# Patient Record
Sex: FEMALE | Race: WHITE | Employment: FULL TIME | URBAN - METROPOLITAN AREA
[De-identification: names, ages, dates, MRNs, and addresses within clinical notes are randomized per-mention and may not be internally consistent; named-entity substitution may affect disease eponyms.]

---

## 2017-01-27 ENCOUNTER — TELEPHONE (OUTPATIENT)
Dept: OTHER | Facility: CLINIC | Age: 57
End: 2017-01-27

## 2017-05-31 DIAGNOSIS — G35 MULTIPLE SCLEROSIS (H): ICD-10-CM

## 2017-06-01 ENCOUNTER — TELEPHONE (OUTPATIENT)
Dept: NEUROLOGY | Facility: CLINIC | Age: 57
End: 2017-06-01

## 2017-06-01 RX ORDER — INTERFERON BETA-1A 44 UG/.5ML
INJECTION, SOLUTION SUBCUTANEOUS
Qty: 12 SYRINGE | Refills: 11 | Status: SHIPPED | OUTPATIENT
Start: 2017-06-01 | End: 2018-12-28

## 2017-06-01 NOTE — TELEPHONE ENCOUNTER
Received refill request for Rebif from [facility]; Patient was last seen in November and has no follow up appointment with any MS doctor, Refilled for 1 year per MS refill protocol. I will have the clinic coordinators call her and set up an appointment with Dr. Rose.    Sowmya Valerio, MS RN Care Coordinator

## 2017-06-01 NOTE — TELEPHONE ENCOUNTER
PA Initiation    Medication: Rebif 44g  Insurance Company: [employer] - Phone [phone] Fax [phone]  Pharmacy Filling the Rx: [facility] - [place], [place] - [facility] [address]  Filling Pharmacy Phone:    Filling Pharmacy Fax:    Start Date: 6/1/2017    Paper PA completed, faxed and sent to scanning.

## 2017-06-01 NOTE — TELEPHONE ENCOUNTER
Our clinic coordinators called this patient to schedule a follow up. Andreina states she is transferring her care to [facility].    Sowmya Valerio MS RN Care Coordinator

## 2017-06-01 NOTE — TELEPHONE ENCOUNTER
Prior Authorization Specialty Medication Request    Medication/Dose: Rebif 44mcg  Diagnosis and ICD: Relapsing Remitting Multiple Sclerosis, G35  New/Renewal/Insurance Change PA: Renewal    Important Lab Values: n/a    Previously Tried and Failed Therapies: Avenox    Rationale: Continuation of disease modifying therapy for demyelinating disease, clinically stable. Please approve.    Would you like to include any research articles? n/a   If yes please include the hyperlink(s) below or fax @ [phone].    (Include Name and MRN)    If you received a fax notification from an outside Pharmacy;  Pharmacy Name:[facility]  Pharmacy #:[phone]  Pharmacy Fax:[phone]

## 2017-06-05 NOTE — TELEPHONE ENCOUNTER
Prior Authorization Approval    Authorization Effective Date: 6/1/2017  Authorization Expiration Date: 6/1/2019  Medication: Rebif 44mcg  Approved Dose/Quantity: 12 per 28 days  Reference #: not legible on print out   Insurance Company: [employer] - Phone [phone] Fax [phone]  Expected CoPay: $25     CoPay Card Available:      Foundation Assistance Needed:    Which Pharmacy is filling the prescription (Not needed for infusion/clinic administered): [facility] - [place] [address] [address]  Pharmacy Notified: Yes  Patient Notified: YesComment:  Pharmacy notified patient.

## 2017-06-10 ENCOUNTER — HEALTH MAINTENANCE LETTER (OUTPATIENT)
Age: 57
End: 2017-06-10

## 2017-08-31 DIAGNOSIS — E78.5 HYPERLIPIDEMIA WITH TARGET LDL LESS THAN 160: ICD-10-CM

## 2017-08-31 RX ORDER — ATORVASTATIN CALCIUM 10 MG/1
10 TABLET, FILM COATED ORAL DAILY
Qty: 90 TABLET | Refills: 1 | OUTPATIENT
Start: 2017-08-31

## 2017-08-31 NOTE — TELEPHONE ENCOUNTER
I have not seen her since October 2015 so I can not fill this  She may be seeing a different PCP elsewhere

## 2017-08-31 NOTE — TELEPHONE ENCOUNTER
Routing refill request to provider for review/approval because:  A break in medication  Patient needs to be seen because it has been more than 1 year since last office visit.  Needs updated lipids    Babita Morelos RN   [facility], [facility]

## 2017-08-31 NOTE — TELEPHONE ENCOUNTER
atorvastatin (LIPITOR) 10 MG tablet       Last Written Prescription Date: 10/18/16  Last Fill Quantity: 90, # refills: 1    Last Office Visit with G, P or [facility] prescribing provider:  10/23/15   Future Office Visit:      Cholesterol   Date Value Ref Range Status   07/11/2016 237 (H) <200 mg/dL Final     Comment:     Desirable:       <200 mg/dl     HDL Cholesterol   Date Value Ref Range Status   07/11/2016 57 >49 mg/dL Final     LDL Cholesterol Calculated   Date Value Ref Range Status   07/11/2016 156 (H) <100 mg/dL Final     Comment:     Above desirable:  100-129 mg/dl   Borderline High:  130-159 mg/dL   High:             160-189 mg/dL   Very high:       >189 mg/dl       Triglycerides   Date Value Ref Range Status   07/11/2016 121 <150 mg/dL Final     Comment:     Fasting specimen     Cholesterol/HDL Ratio   Date Value Ref Range Status   10/23/2015 5.3 (H) 0.0 - 5.0 Final     ALT   Date Value Ref Range Status   11/14/2016 28 0 - 50 U/L Final        LAMAR Mathew

## 2017-08-31 NOTE — TELEPHONE ENCOUNTER
Spoke with the patient, she has not transferred care and is not ready to schedule an appointment at this time. She will call back to schedule when ready. Notification sent to pharmacy that patient needs an office visit.    Babita Morelos RN   [facility]

## 2017-09-21 ENCOUNTER — OFFICE VISIT (OUTPATIENT)
Dept: PEDIATRICS | Facility: CLINIC | Age: 57
End: 2017-09-21
Payer: COMMERCIAL

## 2017-09-21 VITALS
DIASTOLIC BLOOD PRESSURE: 75 MMHG | OXYGEN SATURATION: 100 % | WEIGHT: 126 LBS | HEIGHT: 64 IN | SYSTOLIC BLOOD PRESSURE: 117 MMHG | TEMPERATURE: 99.7 F | HEART RATE: 75 BPM | BODY MASS INDEX: 21.51 KG/M2

## 2017-09-21 DIAGNOSIS — Z11.59 NEED FOR HEPATITIS C SCREENING TEST: ICD-10-CM

## 2017-09-21 DIAGNOSIS — Z23 NEED FOR PROPHYLACTIC VACCINATION AND INOCULATION AGAINST INFLUENZA: ICD-10-CM

## 2017-09-21 DIAGNOSIS — Z12.31 VISIT FOR SCREENING MAMMOGRAM: ICD-10-CM

## 2017-09-21 DIAGNOSIS — E78.5 HYPERLIPIDEMIA WITH TARGET LDL LESS THAN 160: Primary | ICD-10-CM

## 2017-09-21 DIAGNOSIS — E78.5 HYPERLIPIDEMIA WITH TARGET LDL LESS THAN 160: ICD-10-CM

## 2017-09-21 DIAGNOSIS — Z13.1 SCREENING FOR DIABETES MELLITUS: ICD-10-CM

## 2017-09-21 LAB
ALBUMIN SERPL-MCNC: 3.8 G/DL (ref 3.4–5)
ALP SERPL-CCNC: 79 U/L (ref 40–150)
ALT SERPL W P-5'-P-CCNC: 31 U/L (ref 0–50)
ANION GAP SERPL CALCULATED.3IONS-SCNC: 5 MMOL/L (ref 3–14)
AST SERPL W P-5'-P-CCNC: 22 U/L (ref 0–45)
BILIRUB SERPL-MCNC: 0.3 MG/DL (ref 0.2–1.3)
BUN SERPL-MCNC: 19 MG/DL (ref 7–30)
CALCIUM SERPL-MCNC: 10.3 MG/DL (ref 8.5–10.1)
CHLORIDE SERPL-SCNC: 106 MMOL/L (ref 94–109)
CHOLEST SERPL-MCNC: 206 MG/DL
CO2 SERPL-SCNC: 29 MMOL/L (ref 20–32)
CREAT SERPL-MCNC: 0.87 MG/DL (ref 0.52–1.04)
GFR SERPL CREATININE-BSD FRML MDRD: 68 ML/MIN/1.7M2
GLUCOSE SERPL-MCNC: 102 MG/DL (ref 70–99)
HDLC SERPL-MCNC: 44 MG/DL
LDLC SERPL CALC-MCNC: 137 MG/DL
NONHDLC SERPL-MCNC: 162 MG/DL
POTASSIUM SERPL-SCNC: 4.2 MMOL/L (ref 3.4–5.3)
PROT SERPL-MCNC: 7.5 G/DL (ref 6.8–8.8)
SODIUM SERPL-SCNC: 140 MMOL/L (ref 133–144)
TRIGL SERPL-MCNC: 126 MG/DL

## 2017-09-21 PROCEDURE — 90686 IIV4 VACC NO PRSV 0.5 ML IM: CPT | Performed by: NURSE PRACTITIONER

## 2017-09-21 PROCEDURE — 86803 HEPATITIS C AB TEST: CPT | Performed by: NURSE PRACTITIONER

## 2017-09-21 PROCEDURE — 80053 COMPREHEN METABOLIC PANEL: CPT | Performed by: NURSE PRACTITIONER

## 2017-09-21 PROCEDURE — 99213 OFFICE O/P EST LOW 20 MIN: CPT | Mod: 25 | Performed by: NURSE PRACTITIONER

## 2017-09-21 PROCEDURE — 90471 IMMUNIZATION ADMIN: CPT | Performed by: NURSE PRACTITIONER

## 2017-09-21 PROCEDURE — 36415 COLL VENOUS BLD VENIPUNCTURE: CPT | Performed by: NURSE PRACTITIONER

## 2017-09-21 PROCEDURE — 80061 LIPID PANEL: CPT | Performed by: NURSE PRACTITIONER

## 2017-09-21 RX ORDER — ATORVASTATIN CALCIUM 10 MG/1
10 TABLET, FILM COATED ORAL DAILY
Qty: 90 TABLET | Refills: 3 | Status: SHIPPED | OUTPATIENT
Start: 2017-09-21 | End: 2018-12-06

## 2017-09-21 NOTE — PROGRESS NOTES

## 2017-09-21 NOTE — NURSING NOTE
"Chief Complaint   Patient presents with     Recheck Medication       Initial /75  Pulse 75  Temp 99  F (37.2  C) (Oral)  Ht 5' 4\" (1.626 m)  Wt 126 lb (57.2 kg)  SpO2 100%  BMI 21.63 kg/m2 Estimated body mass index is 21.63 kg/(m^2) as calculated from the following:    Height as of this encounter: 5' 4\" (1.626 m).    Weight as of this encounter: 126 lb (57.2 kg).  Medication Reconciliation: complete       Roxy Hernandez MA      "

## 2017-09-21 NOTE — PATIENT INSTRUCTIONS
PLAN:   1.   Symptomatic therapy suggested: Continue current medications.  2.  Orders Placed This Encounter   Medications     atorvastatin (LIPITOR) 10 MG tablet     Sig: Take 1 tablet (10 mg) by mouth daily     Dispense:  90 tablet     Refill:  3     Orders Placed This Encounter   Procedures     MA SCREENING DIGITAL BILAT - Future  (s+30)     HC FLU VAC PRESRV FREE QUAD SPLIT VIR 3+YRS IM     ADMIN 1st VACCINE       3. Patient needs to follow up in if no improvement,or sooner if worsening of symptoms or other symptoms develop.  FURTHER TESTING:       - mammogram  Will follow up and/or notify patient of  results via My Chart to determine further need for followup  Schedule physical exam   It was a pleasure seeing you today at the [facility]. Thank you for allowing us to care for you today. We truly hope we provided you with the excellent service you deserve. Please let us know if there is anything else we can do for you so we can be sure you are leaving completley satisfied with your care experience.       General information about your clinic   Clinic Hours Lab Hours (Appointments are required)   Mon-Thurs: 7:30 AM - 7 PM Mon-Thurs: 7:30 AM - 7 PM   Fri: 7:30 AM - 5 PM Fri: 7:30 AM - 5 PM        After Hours Nurse Advise & Appts:  [facility] Nurse Advisors: [phone]  [facility] On Call: to make appointments anytime: [phone] On Call Physician: call [phone] and answering service will page the on call physician.        For urgent appointments, please call [phone] and ask for the triage nurse or your care team clinic nurse.  How to contact my care team:  MyChart: [email or web address]   Phone: [phone]   Fax: [phone]       [facility]:   Phone: [phone]  Hours: 8:00 AM - 6:00 PM  Medication Refills:  Call your pharmacy and they will forward the refill to us. Please allow 3 business days for your refills to be completed.       Normal or non-critical lab  and imaging results will be communicated to you by MyChart, letter or phone within 7 days.  If you do not hear from us within 10 days, please call the clinic. If you have a critical or abnormal lab result, we will notify you by phone as soon as possible.       We now have PWIC (Pediatric Walk in Care)  Monday-Friday from 7:30-4. Simply walk in and be seen for your urgent needs like cough, fever, rash, diarrhea or vomiting, pink eye, UTI. No appointments needed. Ask one of the team for more information      -Your Care Team:    Dr. Nellie Quiles - Internal Medicine/Pediatrics   Dr. Ananda Duncan - Family Medicine  Dr. Christina Love - Pediatrics  Dr. Yojana Cavazos - Pediatrics  Marilyn Rose CNP - Family Practice Nurse Practitioner

## 2017-09-21 NOTE — MR AVS SNAPSHOT
After Visit Summary   9/21/2017    Andreina Duggan    MRN: [id number]           Patient Information     Date Of Birth          1960        Visit Information        Provider Department      9/21/2017 7:50 AM Marilyn Rose APRN [facility]        Today's Diagnoses     Hyperlipidemia with target LDL less than 160    -  1    Visit for screening mammogram        Screening for malignant neoplasm of cervix        Need for hepatitis C screening test        Need for prophylactic vaccination and inoculation against influenza          Care Instructions    PLAN:   1.   Symptomatic therapy suggested: Continue current medications.  2.  Orders Placed This Encounter   Medications     atorvastatin (LIPITOR) 10 MG tablet     Sig: Take 1 tablet (10 mg) by mouth daily     Dispense:  90 tablet     Refill:  3     Orders Placed This Encounter   Procedures     MA SCREENING DIGITAL BILAT - Future  (s+30)     HC FLU VAC PRESRV FREE QUAD SPLIT VIR 3+YRS IM     ADMIN 1st VACCINE       3. Patient needs to follow up in if no improvement,or sooner if worsening of symptoms or other symptoms develop.  FURTHER TESTING:       - mammogram  Will follow up and/or notify patient of  results via My Chart to determine further need for followup  Schedule physical exam   It was a pleasure seeing you today at the [facility]. Thank you for allowing us to care for you today. We truly hope we provided you with the excellent service you deserve. Please let us know if there is anything else we can do for you so we can be sure you are leaving completley satisfied with your care experience.       General information about your clinic   Clinic Hours Lab Hours (Appointments are required)   Mon-Thurs: 7:30 AM - 7 PM Mon-Thurs: 7:30 AM - 7 PM   Fri: 7:30 AM - 5 PM Fri: 7:30 AM - 5 PM        After Hours Nurse Advise & Appts:  [facility] Nurse Advisors: [phone]  [facility] On Call: to make  appointments anytime: [phone] On Call Physician: call [phone] and answering service will page the on call physician.        For urgent appointments, please call [phone] and ask for the triage nurse or your care team clinic nurse.  How to contact my care team:  Echo: [email or web address]   Phone: [phone]   Fax: [phone]       [facility]:   Phone: [phone]  Hours: 8:00 AM - 6:00 PM  Medication Refills:  Call your pharmacy and they will forward the refill to us. Please allow 3 business days for your refills to be completed.       Normal or non-critical lab and imaging results will be communicated to you by MyChart, letter or phone within 7 days.  If you do not hear from us within 10 days, please call the clinic. If you have a critical or abnormal lab result, we will notify you by phone as soon as possible.       We now have PWIC (Pediatric Walk in Care)  Monday-Friday from 7:30-4. Simply walk in and be seen for your urgent needs like cough, fever, rash, diarrhea or vomiting, pink eye, UTI. No appointments needed. Ask one of the team for more information      -Your Care Team:    Dr. Nellie Quiles - Internal Medicine/Pediatrics   Dr. Ananda Duncan - Family Medicine  Dr. Christina Love - Pediatrics  Dr. Yojana Cavazos - Pediatrics  Marilyn Rose CNP - Family Practice Nurse Practitioner                         Follow-ups after your visit        Future tests that were ordered for you today     Open Future Orders        Priority Expected Expires Ordered    MA SCREENING DIGITAL BILAT - Future  (s+30) Routine  9/21/2018 9/21/2017            Who to contact     If you have questions or need follow up information about today's clinic visit or your schedule please contact [facility] directly at [phone].  Normal or non-critical lab and imaging results will be communicated to you by MyChart, letter or phone within 4 business days after the clinic has received the results.  "If you do not hear from us within 7 days, please contact the clinic through [employer] or phone. If you have a critical or abnormal lab result, we will notify you by phone as soon as possible.  Submit refill requests through [employer] or call your pharmacy and they will forward the refill request to us. Please allow 3 business days for your refill to be completed.          Additional Information About Your Visit        [email or web address] Information     [employer] gives you secure access to your electronic health record. If you see a primary care provider, you can also send messages to your care team and make appointments. If you have questions, please call your primary care clinic.  If you do not have a primary care provider, please call [phone] and they will assist you.      YesWeAd is an electronic gateway that provides easy, online access to your medical records. With YesWe[employer], you can request a clinic appointment, read your test results, renew a prescription or communicate with your care team.     To access your existing account, please contact your [facility] or call [phone] for assistance.        Care EveryWhere ID     This is your Care EveryWhere ID. This could be used by other organizations to access your [facility] medical records  [id number]        Your Vitals Were     Pulse Temperature Height Pulse Oximetry BMI (Body Mass Index)       75 99.7  F (37.6  C) (Temporal) 5' 4\" (1.626 m) 100% 21.63 kg/m2        Blood Pressure from Last 3 Encounters:   09/21/17 117/75   11/14/16 144/85   03/31/16 109/65    Weight from Last 3 Encounters:   09/21/17 126 lb (57.2 kg)   03/23/16 127 lb (57.6 kg)   03/25/16 127 lb (57.6 kg)              We Performed the Following     ADMIN 1st VACCINE     HC FLU VAC PRESRV FREE QUAD SPLIT VIR 3+YRS IM          Where to get your medicines      These medications were sent to [id number] - [facility] [address] AT [facility] [address]  [address], [facility] [address]     Phone:  [phone]     atorvastatin 10 MG tablet          Primary Care Provider Office Phone # Fax #    Marilyn Rose, [facility]US [facility] [phone] [address]   [place] [address]        Equal Access to Services     DANIELA KAUR : Hadii aad ku hadasho Soomaali, waaxda luqadaha, qaybta kaalmada adeegyada, waxay idiin hayaan adeeg kharash la'aan . So [facility] [phone].    ATENCIÓN: Si habla español, tiene a glez disposición servicios gratuitos de asistencia lingüística. [facility] [phone].    We comply with applicable federal civil rights laws and [place] laws. We do not discriminate on the basis of race, color, national origin, age, disability sex, sexual orientation or gender identity.            Thank you!     Thank you for choosing [facility]  for your care. Our goal is always to provide you with excellent care. Hearing back from our patients is one way we can continue to improve our services. Please take a few minutes to complete the written survey that you may receive in the mail after your visit with us. Thank you!             Your Updated Medication List - Protect others around you: Learn how to safely use, store and throw away your medicines at www.disposemymeds.org.          This list is accurate as of: 9/21/17  8:22 AM.  Always use your most recent med list.                   Brand Name Dispense Instructions for use Diagnosis    ASPIRIN PO      Take 325 mg by mouth as needed for moderate pain        atorvastatin 10 MG tablet    LIPITOR    90 tablet    Take 1 tablet (10 mg) by mouth daily    Hyperlipidemia with target LDL less than 160       MULTIPLE VITAMIN PO      Take by mouth daily        REBIF 44 MCG/0.5ML injection   Generic drug:  interferon beta-1a     12 Syringe    INJECT THE CONTENTS OF 1 SYRINGE (44MCG) UNDER THE SKIN THREE TIMES WEEKLY    Multiple sclerosis (H)       vitamin D 2000 UNITS  tablet      Take 1 tablet by mouth daily

## 2017-09-21 NOTE — PROGRESS NOTES
Grace Duggan,    Attached are your test results.  -Liver and gallbladder tests (ALT,AST, Alk phos,bilirubin) are normal.  -Kidney function (GFR) is normal.  -Sodium is normal.  -Potassium is normal.  -Glucose is slight elevated and may be sign of early diabetes (prediabetes). ADVISE:: low carbohydrate diet, exercise, try to lose weight (if necessary) and recheck glucose in 12 months. (GLU,A1C, DX: prediabetes)  -Cholesterol levels are at your goal levels.  ADVISE: Continuing your medication, a regular exercise program with at least 30 minutes of aerobic exercise 3-4 days/week ( 45 minutes 4-6 days/week if weight loss needed), and a low saturated fat,/low carbohydrate diet are helpful to maintain this.  Rechecking your fasting cholesterol panel in 12 months is recommended (Lipid w/ LDL reflex).   Please contact us if you have any questions.    Marilyn Rose, CNP

## 2017-09-21 NOTE — PROGRESS NOTES
SUBJECTIVE:   Andreina Duggan is a 56 year old female who presents to clinic today for the following health issues:    Medication Followup of  LIPITOR    Taking Medication as prescribed: yes    Side Effects:  None    Medication Helping Symptoms:  Pt. States she is unsure until blood test is done to check cholesterol        Problem list and histories reviewed & adjusted, as indicated.  Additional history: as documented    Patient Active Problem List   Diagnosis     MS (multiple sclerosis) (H)     Hyperlipidemia with target LDL less than 160     Past Surgical History:   Procedure Laterality Date     COLONOSCOPY N/A 3/31/2016    Procedure: COLONOSCOPY;  Surgeon: Anuel Madrigal MD;  Location: [facility] OR     LAPAROSCOPY DIAGNOSTIC (GYN)  1980s    left ovary problems, a couple of scopes       Social History   Substance Use Topics     Smoking status: Current Every Day Smoker     Packs/day: 0.50     Years: 30.00     Types: Cigarettes     Smokeless tobacco: Never Used      Comment: she isn't sure when     Alcohol use 4.2 oz/week     7 Standard drinks or equivalent per week     Family History   Problem Relation Age of Onset     DIABETES Father      Hypertension Father      Breast Cancer No family hx of      Cancer - colorectal No family hx of          Current Outpatient Prescriptions   Medication Sig Dispense Refill     REBIF 44 MCG/0.5ML injection INJECT THE CONTENTS OF 1 SYRINGE (44MCG) UNDER THE SKIN THREE TIMES WEEKLY 12 Syringe 11     atorvastatin (LIPITOR) 10 MG tablet Take 1 tablet (10 mg) by mouth daily 90 tablet 1     Cholecalciferol (VITAMIN D) 2000 UNITS tablet Take 1 tablet by mouth daily       MULTIPLE VITAMIN PO Take by mouth daily       ASPIRIN PO Take 325 mg by mouth as needed for moderate pain       No Known Allergies  Recent Labs   Lab Test  09/21/17   0726  11/14/16   1523  07/11/16   0707  10/23/15   0833   01/07/14   1550   LDL  137*   --   156*  160*   < >   --    HDL  44*   --   57  49*   < >   --  "   TRIG  126   --   121  250*   < >   --    ALT  31  28  28  26   < >  44   CR  0.87   --    --   0.84   --    --    GFRESTIMATED  68   --    --   71   --    --    GFRESTBLACK  82   --    --   86   --    --    POTASSIUM  4.2   --    --   4.7   --    --    TSH   --    --    --   1.54   --   1.23    < > = values in this interval not displayed.      Labs reviewed in EPIC        Reviewed and updated as needed this visit by clinical staffAllergies  Meds       Reviewed and updated as needed this visit by Provider         ROS:  CONSTITUTIONAL:NEGATIVE for fever, chills, change in weight  RESP:NEGATIVE for significant cough or SOB  CV: NEGATIVE for chest pain, palpitations or peripheral edema  GI: NEGATIVE for nausea, abdominal pain, heartburn, or change in bowel habits  MUSCULOSKELETAL: NEGATIVE for significant arthralgias or myalgia  Sees Dr. Ribeiro at [facility] for follow up on MS   ENDOCRINE: NEGATIVE for temperature intolerance, skin/hair changes  HEME/ALLERGY/IMMUNE: NEGATIVE for bleeding problems    OBJECTIVE:     /75  Pulse 75  Temp 99.7  F (37.6  C) (Temporal)  Ht 5' 4\" (1.626 m)  Wt 126 lb (57.2 kg)  SpO2 100%  BMI 21.63 kg/m2  Body mass index is 21.63 kg/(m^2).  GENERAL APPEARANCE: alert, active and no distress  RESP: lungs clear to auscultation - no rales, rhonchi or wheezes  CV: regular rates and rhythm and no murmur, click or rub  MS: extremities normal- no gross deformities noted  NEURO: Normal strength and tone, mentation intact and speech normal  PSYCH: mentation appears normal and affect normal/bright    Diagnostic Test Results:  Results for orders placed or performed in visit on 09/21/17   Lipid panel reflex to direct LDL   Result Value Ref Range    Cholesterol 206 (H) <200 mg/dL    Triglycerides 126 <150 mg/dL    HDL Cholesterol 44 (L) >49 mg/dL    LDL Cholesterol Calculated 137 (H) <100 mg/dL    Non HDL Cholesterol 162 (H) <130 mg/dL   Hepatitis C Screen Reflex to HCV RNA Quant and " Genotype   Result Value Ref Range    Hepatitis C Antibody Nonreactive NR^Nonreactive   Comprehensive metabolic panel   Result Value Ref Range    Sodium 140 133 - 144 mmol/L    Potassium 4.2 3.4 - 5.3 mmol/L    Chloride 106 94 - 109 mmol/L    Carbon Dioxide 29 20 - 32 mmol/L    Anion Gap 5 3 - 14 mmol/L    Glucose 102 (H) 70 - 99 mg/dL    Urea Nitrogen 19 7 - 30 mg/dL    Creatinine 0.87 0.52 - 1.04 mg/dL    GFR Estimate 68 >60 mL/min/1.7m2    GFR Estimate If Black 82 >60 mL/min/1.7m2    Calcium 10.3 (H) 8.5 - 10.1 mg/dL    Bilirubin Total 0.3 0.2 - 1.3 mg/dL    Albumin 3.8 3.4 - 5.0 g/dL    Protein Total 7.5 6.8 - 8.8 g/dL    Alkaline Phosphatase 79 40 - 150 U/L    ALT 31 0 - 50 U/L    AST 22 0 - 45 U/L       ASSESSMENT/PLAN:       Andreina was seen today for recheck medication.    Diagnoses and all orders for this visit:    Hyperlipidemia with target LDL less than 160  -     atorvastatin (LIPITOR) 10 MG tablet; Take 1 tablet (10 mg) by mouth daily    Visit for screening mammogram  -     MA SCREENING DIGITAL BILAT - Future  (s+30); Future    Need for prophylactic vaccination and inoculation against influenza  -     HC FLU VAC PRESRV FREE QUAD SPLIT VIR 3+YRS IM  -     ADMIN 1st VACCINE    PLAN:   . Patient needs to follow up in if no improvement,or sooner if worsening of symptoms or other symptoms develop.  FURTHER TESTING:       - mammogram  Will follow up and/or notify patient of  results via My Chart to determine further need for followup  Schedule physical exam   See Patient Instructions    CASSIUS Ya CNP  [facility]

## 2017-09-22 LAB — HCV AB SERPL QL IA: NONREACTIVE

## 2017-09-22 NOTE — PROGRESS NOTES
Grace Duggan,    Attached are your test results.  -Hepatitis C antibody screen test shows no signs of a previous hepatitis C infection.   Please contact us if you have any questions.    Marilyn Rose, CNP

## 2017-09-26 ENCOUNTER — RADIANT APPOINTMENT (OUTPATIENT)
Dept: MAMMOGRAPHY | Facility: CLINIC | Age: 57
End: 2017-09-26
Attending: NURSE PRACTITIONER
Payer: COMMERCIAL

## 2017-09-26 DIAGNOSIS — G35 MULTIPLE SCLEROSIS (H): Primary | ICD-10-CM

## 2017-09-26 DIAGNOSIS — Z12.31 VISIT FOR SCREENING MAMMOGRAM: ICD-10-CM

## 2017-09-26 DIAGNOSIS — R27.0 ATAXIA: ICD-10-CM

## 2017-09-26 LAB
ALT SERPL W P-5'-P-CCNC: 30 U/L (ref 0–50)
AST SERPL W P-5'-P-CCNC: 19 U/L (ref 0–45)
BASOPHILS # BLD AUTO: 0 10E9/L (ref 0–0.2)
BASOPHILS NFR BLD AUTO: 0.2 %
BILIRUB SERPL-MCNC: 0.4 MG/DL (ref 0.2–1.3)
DIFFERENTIAL METHOD BLD: NORMAL
EOSINOPHIL # BLD AUTO: 0.2 10E9/L (ref 0–0.7)
EOSINOPHIL NFR BLD AUTO: 2.7 %
ERYTHROCYTE [DISTWIDTH] IN BLOOD BY AUTOMATED COUNT: 13.8 % (ref 10–15)
HCT VFR BLD AUTO: 39.4 % (ref 35–47)
HGB BLD-MCNC: 13.3 G/DL (ref 11.7–15.7)
LYMPHOCYTES # BLD AUTO: 1.2 10E9/L (ref 0.8–5.3)
LYMPHOCYTES NFR BLD AUTO: 20.7 %
MCH RBC QN AUTO: 30.4 PG (ref 26.5–33)
MCHC RBC AUTO-ENTMCNC: 33.8 G/DL (ref 31.5–36.5)
MCV RBC AUTO: 90 FL (ref 78–100)
MONOCYTES # BLD AUTO: 0.5 10E9/L (ref 0–1.3)
MONOCYTES NFR BLD AUTO: 9.2 %
NEUTROPHILS # BLD AUTO: 3.8 10E9/L (ref 1.6–8.3)
NEUTROPHILS NFR BLD AUTO: 67.2 %
PLATELET # BLD AUTO: 207 10E9/L (ref 150–450)
RBC # BLD AUTO: 4.37 10E12/L (ref 3.8–5.2)
WBC # BLD AUTO: 5.6 10E9/L (ref 4–11)

## 2017-09-26 PROCEDURE — 82247 BILIRUBIN TOTAL: CPT | Performed by: PSYCHIATRY & NEUROLOGY

## 2017-09-26 PROCEDURE — 36415 COLL VENOUS BLD VENIPUNCTURE: CPT | Performed by: PSYCHIATRY & NEUROLOGY

## 2017-09-26 PROCEDURE — 84450 TRANSFERASE (AST) (SGOT): CPT | Performed by: PSYCHIATRY & NEUROLOGY

## 2017-09-26 PROCEDURE — G0202 SCR MAMMO BI INCL CAD: HCPCS

## 2017-09-26 PROCEDURE — 85025 COMPLETE CBC W/AUTO DIFF WBC: CPT | Performed by: PSYCHIATRY & NEUROLOGY

## 2017-09-26 PROCEDURE — 84460 ALANINE AMINO (ALT) (SGPT): CPT | Performed by: PSYCHIATRY & NEUROLOGY

## 2017-12-21 ENCOUNTER — TRANSFERRED RECORDS (OUTPATIENT)
Dept: HEALTH INFORMATION MANAGEMENT | Facility: CLINIC | Age: 57
End: 2017-12-21

## 2017-12-21 LAB
ALT SERPL-CCNC: 17 U/L (ref 6–29)
AST SERPL-CCNC: 19 U/L (ref 10–35)
TSH SERPL-ACNC: 1.26 MIU/L (ref 0.4–4.5)

## 2018-02-28 DIAGNOSIS — Z51.81 ENCOUNTER FOR THERAPEUTIC DRUG MONITORING: Primary | ICD-10-CM

## 2018-02-28 LAB
ALT SERPL W P-5'-P-CCNC: 35 U/L (ref 0–50)
AST SERPL W P-5'-P-CCNC: 20 U/L (ref 0–45)
BASOPHILS # BLD AUTO: 0 10E9/L (ref 0–0.2)
BASOPHILS NFR BLD AUTO: 0.7 %
BILIRUB SERPL-MCNC: 0.3 MG/DL (ref 0.2–1.3)
DIFFERENTIAL METHOD BLD: NORMAL
EOSINOPHIL # BLD AUTO: 0.2 10E9/L (ref 0–0.7)
EOSINOPHIL NFR BLD AUTO: 2.7 %
ERYTHROCYTE [DISTWIDTH] IN BLOOD BY AUTOMATED COUNT: 13.8 % (ref 10–15)
HCT VFR BLD AUTO: 39.3 % (ref 35–47)
HGB BLD-MCNC: 12.5 G/DL (ref 11.7–15.7)
IMM GRANULOCYTES # BLD: 0 10E9/L (ref 0–0.4)
IMM GRANULOCYTES NFR BLD: 0.2 %
LYMPHOCYTES # BLD AUTO: 2.1 10E9/L (ref 0.8–5.3)
LYMPHOCYTES NFR BLD AUTO: 34.4 %
MCH RBC QN AUTO: 29.2 PG (ref 26.5–33)
MCHC RBC AUTO-ENTMCNC: 31.8 G/DL (ref 31.5–36.5)
MCV RBC AUTO: 92 FL (ref 78–100)
MONOCYTES # BLD AUTO: 0.7 10E9/L (ref 0–1.3)
MONOCYTES NFR BLD AUTO: 10.9 %
NEUTROPHILS # BLD AUTO: 3.1 10E9/L (ref 1.6–8.3)
NEUTROPHILS NFR BLD AUTO: 51.1 %
PLATELET # BLD AUTO: 217 10E9/L (ref 150–450)
RBC # BLD AUTO: 4.28 10E12/L (ref 3.8–5.2)
WBC # BLD AUTO: 6 10E9/L (ref 4–11)

## 2018-02-28 PROCEDURE — 85025 COMPLETE CBC W/AUTO DIFF WBC: CPT | Performed by: PSYCHIATRY & NEUROLOGY

## 2018-02-28 PROCEDURE — 84460 ALANINE AMINO (ALT) (SGPT): CPT | Performed by: PSYCHIATRY & NEUROLOGY

## 2018-02-28 PROCEDURE — 82247 BILIRUBIN TOTAL: CPT | Performed by: PSYCHIATRY & NEUROLOGY

## 2018-02-28 PROCEDURE — 84450 TRANSFERASE (AST) (SGOT): CPT | Performed by: PSYCHIATRY & NEUROLOGY

## 2018-02-28 PROCEDURE — 36415 COLL VENOUS BLD VENIPUNCTURE: CPT | Performed by: PSYCHIATRY & NEUROLOGY

## 2018-03-01 ENCOUNTER — TRANSFERRED RECORDS (OUTPATIENT)
Dept: HEALTH INFORMATION MANAGEMENT | Facility: CLINIC | Age: 58
End: 2018-03-01

## 2018-03-24 ENCOUNTER — TELEPHONE (OUTPATIENT)
Dept: PEDIATRICS | Facility: CLINIC | Age: 58
End: 2018-03-24

## 2018-03-30 DIAGNOSIS — Z51.81 ENCOUNTER FOR THERAPEUTIC DRUG MONITORING: ICD-10-CM

## 2018-03-30 LAB
ALT SERPL W P-5'-P-CCNC: 27 U/L (ref 0–50)
AST SERPL W P-5'-P-CCNC: 19 U/L (ref 0–45)
BASOPHILS # BLD AUTO: 0 10E9/L (ref 0–0.2)
BASOPHILS NFR BLD AUTO: 0.7 %
BILIRUB SERPL-MCNC: 0.4 MG/DL (ref 0.2–1.3)
DIFFERENTIAL METHOD BLD: NORMAL
EOSINOPHIL # BLD AUTO: 0.2 10E9/L (ref 0–0.7)
EOSINOPHIL NFR BLD AUTO: 3.3 %
ERYTHROCYTE [DISTWIDTH] IN BLOOD BY AUTOMATED COUNT: 13.2 % (ref 10–15)
HCT VFR BLD AUTO: 41.6 % (ref 35–47)
HGB BLD-MCNC: 13.4 G/DL (ref 11.7–15.7)
IMM GRANULOCYTES # BLD: 0 10E9/L (ref 0–0.4)
IMM GRANULOCYTES NFR BLD: 0.2 %
LYMPHOCYTES # BLD AUTO: 1.9 10E9/L (ref 0.8–5.3)
LYMPHOCYTES NFR BLD AUTO: 31.7 %
MCH RBC QN AUTO: 29.8 PG (ref 26.5–33)
MCHC RBC AUTO-ENTMCNC: 32.2 G/DL (ref 31.5–36.5)
MCV RBC AUTO: 92 FL (ref 78–100)
MONOCYTES # BLD AUTO: 0.7 10E9/L (ref 0–1.3)
MONOCYTES NFR BLD AUTO: 11.7 %
NEUTROPHILS # BLD AUTO: 3.2 10E9/L (ref 1.6–8.3)
NEUTROPHILS NFR BLD AUTO: 52.4 %
PLATELET # BLD AUTO: 217 10E9/L (ref 150–450)
RBC # BLD AUTO: 4.5 10E12/L (ref 3.8–5.2)
WBC # BLD AUTO: 6 10E9/L (ref 4–11)

## 2018-03-30 PROCEDURE — 84450 TRANSFERASE (AST) (SGOT): CPT | Performed by: PSYCHIATRY & NEUROLOGY

## 2018-03-30 PROCEDURE — 36415 COLL VENOUS BLD VENIPUNCTURE: CPT | Performed by: PSYCHIATRY & NEUROLOGY

## 2018-03-30 PROCEDURE — 85025 COMPLETE CBC W/AUTO DIFF WBC: CPT | Performed by: PSYCHIATRY & NEUROLOGY

## 2018-03-30 PROCEDURE — 84460 ALANINE AMINO (ALT) (SGPT): CPT | Performed by: PSYCHIATRY & NEUROLOGY

## 2018-03-30 PROCEDURE — 82247 BILIRUBIN TOTAL: CPT | Performed by: PSYCHIATRY & NEUROLOGY

## 2018-04-30 DIAGNOSIS — Z51.81 ENCOUNTER FOR THERAPEUTIC DRUG MONITORING: ICD-10-CM

## 2018-04-30 LAB
ALT SERPL W P-5'-P-CCNC: 32 U/L (ref 0–50)
AST SERPL W P-5'-P-CCNC: 24 U/L (ref 0–45)
BASOPHILS # BLD AUTO: 0.1 10E9/L (ref 0–0.2)
BASOPHILS NFR BLD AUTO: 0.8 %
BILIRUB SERPL-MCNC: 0.4 MG/DL (ref 0.2–1.3)
DIFFERENTIAL METHOD BLD: NORMAL
EOSINOPHIL # BLD AUTO: 0.1 10E9/L (ref 0–0.7)
EOSINOPHIL NFR BLD AUTO: 2.2 %
ERYTHROCYTE [DISTWIDTH] IN BLOOD BY AUTOMATED COUNT: 13.2 % (ref 10–15)
HCT VFR BLD AUTO: 39.4 % (ref 35–47)
HGB BLD-MCNC: 12.8 G/DL (ref 11.7–15.7)
IMM GRANULOCYTES # BLD: 0 10E9/L (ref 0–0.4)
IMM GRANULOCYTES NFR BLD: 0.2 %
LYMPHOCYTES # BLD AUTO: 1.6 10E9/L (ref 0.8–5.3)
LYMPHOCYTES NFR BLD AUTO: 24.4 %
MCH RBC QN AUTO: 29.8 PG (ref 26.5–33)
MCHC RBC AUTO-ENTMCNC: 32.5 G/DL (ref 31.5–36.5)
MCV RBC AUTO: 92 FL (ref 78–100)
MONOCYTES # BLD AUTO: 0.8 10E9/L (ref 0–1.3)
MONOCYTES NFR BLD AUTO: 11.5 %
NEUTROPHILS # BLD AUTO: 4 10E9/L (ref 1.6–8.3)
NEUTROPHILS NFR BLD AUTO: 60.9 %
PLATELET # BLD AUTO: 200 10E9/L (ref 150–450)
RBC # BLD AUTO: 4.29 10E12/L (ref 3.8–5.2)
WBC # BLD AUTO: 6.5 10E9/L (ref 4–11)

## 2018-04-30 PROCEDURE — 84450 TRANSFERASE (AST) (SGOT): CPT | Performed by: PSYCHIATRY & NEUROLOGY

## 2018-04-30 PROCEDURE — 84460 ALANINE AMINO (ALT) (SGPT): CPT | Performed by: PSYCHIATRY & NEUROLOGY

## 2018-04-30 PROCEDURE — 82247 BILIRUBIN TOTAL: CPT | Performed by: PSYCHIATRY & NEUROLOGY

## 2018-04-30 PROCEDURE — 85025 COMPLETE CBC W/AUTO DIFF WBC: CPT | Performed by: PSYCHIATRY & NEUROLOGY

## 2018-04-30 PROCEDURE — 36415 COLL VENOUS BLD VENIPUNCTURE: CPT | Performed by: PSYCHIATRY & NEUROLOGY

## 2018-05-31 DIAGNOSIS — Z51.81 ENCOUNTER FOR THERAPEUTIC DRUG MONITORING: ICD-10-CM

## 2018-05-31 LAB
ALT SERPL W P-5'-P-CCNC: 33 U/L (ref 0–50)
AST SERPL W P-5'-P-CCNC: 20 U/L (ref 0–45)
BASOPHILS # BLD AUTO: 0 10E9/L (ref 0–0.2)
BASOPHILS NFR BLD AUTO: 0.6 %
BILIRUB SERPL-MCNC: 0.7 MG/DL (ref 0.2–1.3)
DIFFERENTIAL METHOD BLD: NORMAL
EOSINOPHIL # BLD AUTO: 0.2 10E9/L (ref 0–0.7)
EOSINOPHIL NFR BLD AUTO: 4 %
ERYTHROCYTE [DISTWIDTH] IN BLOOD BY AUTOMATED COUNT: 12.9 % (ref 10–15)
HCT VFR BLD AUTO: 40.8 % (ref 35–47)
HGB BLD-MCNC: 13.2 G/DL (ref 11.7–15.7)
IMM GRANULOCYTES # BLD: 0 10E9/L (ref 0–0.4)
IMM GRANULOCYTES NFR BLD: 0 %
LYMPHOCYTES # BLD AUTO: 1.3 10E9/L (ref 0.8–5.3)
LYMPHOCYTES NFR BLD AUTO: 27.2 %
MCH RBC QN AUTO: 29.7 PG (ref 26.5–33)
MCHC RBC AUTO-ENTMCNC: 32.4 G/DL (ref 31.5–36.5)
MCV RBC AUTO: 92 FL (ref 78–100)
MONOCYTES # BLD AUTO: 0.7 10E9/L (ref 0–1.3)
MONOCYTES NFR BLD AUTO: 13.5 %
NEUTROPHILS # BLD AUTO: 2.6 10E9/L (ref 1.6–8.3)
NEUTROPHILS NFR BLD AUTO: 54.7 %
PLATELET # BLD AUTO: 171 10E9/L (ref 150–450)
RBC # BLD AUTO: 4.45 10E12/L (ref 3.8–5.2)
WBC # BLD AUTO: 4.8 10E9/L (ref 4–11)

## 2018-05-31 PROCEDURE — 82247 BILIRUBIN TOTAL: CPT | Performed by: PSYCHIATRY & NEUROLOGY

## 2018-05-31 PROCEDURE — 84450 TRANSFERASE (AST) (SGOT): CPT | Performed by: PSYCHIATRY & NEUROLOGY

## 2018-05-31 PROCEDURE — 84460 ALANINE AMINO (ALT) (SGPT): CPT | Performed by: PSYCHIATRY & NEUROLOGY

## 2018-05-31 PROCEDURE — 85025 COMPLETE CBC W/AUTO DIFF WBC: CPT | Performed by: PSYCHIATRY & NEUROLOGY

## 2018-05-31 PROCEDURE — 36415 COLL VENOUS BLD VENIPUNCTURE: CPT | Performed by: PSYCHIATRY & NEUROLOGY

## 2018-06-28 DIAGNOSIS — Z51.81 ENCOUNTER FOR THERAPEUTIC DRUG MONITORING: ICD-10-CM

## 2018-06-28 LAB
ALT SERPL W P-5'-P-CCNC: 28 U/L (ref 0–50)
AST SERPL W P-5'-P-CCNC: 18 U/L (ref 0–45)
BASOPHILS # BLD AUTO: 0.1 10E9/L (ref 0–0.2)
BASOPHILS NFR BLD AUTO: 0.8 %
BILIRUB SERPL-MCNC: 0.6 MG/DL (ref 0.2–1.3)
DIFFERENTIAL METHOD BLD: NORMAL
EOSINOPHIL # BLD AUTO: 0.2 10E9/L (ref 0–0.7)
EOSINOPHIL NFR BLD AUTO: 3 %
ERYTHROCYTE [DISTWIDTH] IN BLOOD BY AUTOMATED COUNT: 12.7 % (ref 10–15)
HCT VFR BLD AUTO: 40.8 % (ref 35–47)
HGB BLD-MCNC: 13.1 G/DL (ref 11.7–15.7)
IMM GRANULOCYTES # BLD: 0 10E9/L (ref 0–0.4)
IMM GRANULOCYTES NFR BLD: 0.2 %
LYMPHOCYTES # BLD AUTO: 1.9 10E9/L (ref 0.8–5.3)
LYMPHOCYTES NFR BLD AUTO: 29.5 %
MCH RBC QN AUTO: 29.6 PG (ref 26.5–33)
MCHC RBC AUTO-ENTMCNC: 32.1 G/DL (ref 31.5–36.5)
MCV RBC AUTO: 92 FL (ref 78–100)
MONOCYTES # BLD AUTO: 0.9 10E9/L (ref 0–1.3)
MONOCYTES NFR BLD AUTO: 13.7 %
NEUTROPHILS # BLD AUTO: 3.4 10E9/L (ref 1.6–8.3)
NEUTROPHILS NFR BLD AUTO: 52.8 %
PLATELET # BLD AUTO: 223 10E9/L (ref 150–450)
RBC # BLD AUTO: 4.43 10E12/L (ref 3.8–5.2)
WBC # BLD AUTO: 6.4 10E9/L (ref 4–11)

## 2018-06-28 PROCEDURE — 82247 BILIRUBIN TOTAL: CPT | Performed by: PSYCHIATRY & NEUROLOGY

## 2018-06-28 PROCEDURE — 85025 COMPLETE CBC W/AUTO DIFF WBC: CPT | Performed by: PSYCHIATRY & NEUROLOGY

## 2018-06-28 PROCEDURE — 84450 TRANSFERASE (AST) (SGOT): CPT | Performed by: PSYCHIATRY & NEUROLOGY

## 2018-06-28 PROCEDURE — 36415 COLL VENOUS BLD VENIPUNCTURE: CPT | Performed by: PSYCHIATRY & NEUROLOGY

## 2018-06-28 PROCEDURE — 84460 ALANINE AMINO (ALT) (SGPT): CPT | Performed by: PSYCHIATRY & NEUROLOGY

## 2018-07-24 ENCOUNTER — OFFICE VISIT (OUTPATIENT)
Dept: DERMATOLOGY | Facility: CLINIC | Age: 58
End: 2018-07-24
Payer: COMMERCIAL

## 2018-07-24 VITALS — SYSTOLIC BLOOD PRESSURE: 144 MMHG | HEART RATE: 56 BPM | DIASTOLIC BLOOD PRESSURE: 86 MMHG | OXYGEN SATURATION: 100 %

## 2018-07-24 DIAGNOSIS — D22.9 MULTIPLE BENIGN NEVI: ICD-10-CM

## 2018-07-24 DIAGNOSIS — Z86.018 HISTORY OF DYSPLASTIC NEVUS: ICD-10-CM

## 2018-07-24 DIAGNOSIS — L82.1 SEBORRHEIC KERATOSIS: ICD-10-CM

## 2018-07-24 DIAGNOSIS — L56.4 POLYMORPHIC LIGHT ERUPTION: Primary | ICD-10-CM

## 2018-07-24 DIAGNOSIS — L81.4 LENTIGO: ICD-10-CM

## 2018-07-24 DIAGNOSIS — D18.01 CHERRY ANGIOMA: ICD-10-CM

## 2018-07-24 PROCEDURE — 99214 OFFICE O/P EST MOD 30 MIN: CPT | Performed by: PHYSICIAN ASSISTANT

## 2018-07-24 NOTE — MR AVS SNAPSHOT
After Visit Summary   7/24/2018    Andreina Duggan    MRN: [id number]           Patient Information     Date Of Birth          1960        Visit Information        Provider Department      7/24/2018 8:20 AM Kasandra Hollis PA-C [facility]        Today's Diagnoses     Polymorphic light eruption    -  1    Multiple benign nevi        Lentigo        [place] angioma        Seborrheic keratosis        History of dysplastic nevus           Follow-ups after your visit        Your next 10 appointments already scheduled     Jul 31, 2018  4:10 PM CDT   Lab visit with LAB FIRST FLOOR [facility] ([facility])    [id number] [place] [id number] [phone]           Please do not eat 10-12 hours before your appointment if you are coming in fasting for labs on lipids, cholesterol, or glucose (sugar). Does not apply to pregnant women.  Water with medications is okay. Do not drink coffee or other fluids.  If you have concerns about taking your medications, please send a message by clicking on Secure Messaging, Message Your Care Team.              Future tests that were ordered for you today     Open Future Orders        Priority Expected Expires Ordered    Anti Nuclear Dior IgG by IFA with Reflex Routine 7/24/2018 7/24/2019 7/24/2018    RADHA antibody panel Routine 7/24/2018 7/24/2019 7/24/2018            Who to contact     If you have questions or need follow up information about today's clinic visit or your schedule please contact [facility] directly at [phone].  Normal or non-critical lab and imaging results will be communicated to you by MyChart, letter or phone within 4 business days after the clinic has received the results. If you do not hear from us within 7 days, please contact the clinic through MyChart or phone. If you have a critical or abnormal lab result, we will notify you by phone as soon as  possible.  Submit refill requests through [employer] or call your pharmacy and they will forward the refill request to us. Please allow 3 business days for your refill to be completed.          Additional Information About Your Visit        Kawa Object[employer]art Information     [employer] gives you secure access to your electronic health record. If you see a primary care provider, you can also send messages to your care team and make appointments. If you have questions, please call your primary care clinic.  If you do not have a primary care provider, please call [phone] and they will assist you.        Care EveryWhere ID     This is your Care EveryWhere ID. This could be used by other organizations to access your [place] medical records  [id number]        Your Vitals Were     Pulse Pulse Oximetry                56 100%           Blood Pressure from Last 3 Encounters:   07/24/18 144/86   09/21/17 117/75   11/14/16 144/85    Weight from Last 3 Encounters:   09/21/17 57.2 kg (126 lb)   03/23/16 57.6 kg (127 lb)   03/25/16 57.6 kg (127 lb)               Primary Care Provider Office Phone # Fax #    Marilyn Sagebiju Rose, APRN [facility] [phone]       [address]   [place] [address]        Equal Access to Services     DANIELA KAUR : Hadii aad ku hadasho Soomaali, waaxda luqadaha, qaybta kaalmada adeegyada, waxay reid haysalvatoren kristyn marie. So [facility] [phone].    ATENCIÓN: Si habla español, tiene a glez disposición servicios gratuitos de asistencia lingüística. Llame al [phone].    We comply with applicable federal civil rights laws and [place] laws. We do not discriminate on the basis of race, color, national origin, age, disability, sex, sexual orientation, or gender identity.            Thank you!     Thank you for choosing [facility]  for your care. Our goal is always to provide you with excellent care. Hearing back from our patients is one way we can continue to improve our  services. Please take a few minutes to complete the written survey that you may receive in the mail after your visit with us. Thank you!             Your Updated Medication List - Protect others around you: Learn how to safely use, store and throw away your medicines at www.disposemymeds.org.          This list is accurate as of 7/24/18  8:59 AM.  Always use your most recent med list.                   Brand Name Dispense Instructions for use Diagnosis    ASPIRIN PO      Take 325 mg by mouth as needed for moderate pain        atorvastatin 10 MG tablet    LIPITOR    90 tablet    Take 1 tablet (10 mg) by mouth daily    Hyperlipidemia with target LDL less than 160       MULTIPLE VITAMIN PO      Take by mouth daily        REBIF 44 MCG/0.5ML injection   Generic drug:  interferon beta-1a     12 Syringe    INJECT THE CONTENTS OF 1 SYRINGE (44MCG) UNDER THE SKIN THREE TIMES WEEKLY    Multiple sclerosis (H)       vitamin D 2000 units tablet      Take 1 tablet by mouth daily

## 2018-07-24 NOTE — NURSING NOTE
"Initial /86  Pulse 56  SpO2 100% Estimated body mass index is 21.63 kg/(m^2) as calculated from the following:    Height as of 9/21/17: 1.626 m (5' 4\").    Weight as of 9/21/17: 57.2 kg (126 lb). .    .  "

## 2018-07-24 NOTE — PROGRESS NOTES
Andreina Duggan is a 57 year old year old female patient here today for skin check. Patient notes that she had a precancerous mole on chest removed about 10 years ago since it was changing color. She reports that she has scaly areas on back then she has accidentally scratched off in the past. Andreina also notes within the past 5 years or so she has developed a sensitivity to the sun; typically seen with first few sun exposures she notes itching and rash. She reports that cortisone helps and improves after a few weeks.  Patient has no other skin complaints today.  Remainder of the HPI, Meds, PMH, Allergies, FH, and SH was reviewed in chart.    Pertinent Hx:  History of Dysplastic nevus on right chest   Past Medical History:   Diagnosis Date     Actinic keratosis      Generalised headaches     sinus headaches     History of migraine headaches      Hyperlipidaemia 2012     Multiple sclerosis (H)        Past Surgical History:   Procedure Laterality Date     COLONOSCOPY N/A 3/31/2016    Procedure: COLONOSCOPY;  Surgeon: Anuel Madrigal MD;  Location: [facility] OR     LAPAROSCOPY DIAGNOSTIC (GYN)  1980s    left ovary problems, a couple of scopes        Family History   Problem Relation Age of Onset     Diabetes Father      Hypertension Father      Breast Cancer No family hx of      Cancer - colorectal No family hx of        Social History     Social History     Marital status:      Spouse name: N/A     Number of children: 2     Years of education: N/A     Occupational History      [employer]     Social History Main Topics     Smoking status: Former Smoker     Packs/day: 0.50     Years: 30.00     Types: Cigarettes     Quit date: 4/30/2018     Smokeless tobacco: Never Used      Comment: she isn't sure when     Alcohol use 4.2 oz/week     7 Standard drinks or equivalent per week     Drug use: No     Sexual activity: Yes     Partners: Male      Comment:  one partner      Other Topics Concern     Not on file      Social History Narrative       Outpatient Encounter Prescriptions as of 7/24/2018   Medication Sig Dispense Refill     ASPIRIN PO Take 325 mg by mouth as needed for moderate pain       atorvastatin (LIPITOR) 10 MG tablet Take 1 tablet (10 mg) by mouth daily 90 tablet 3     Cholecalciferol (VITAMIN D) 2000 UNITS tablet Take 1 tablet by mouth daily       MULTIPLE VITAMIN PO Take by mouth daily       REBIF 44 MCG/0.5ML injection INJECT THE CONTENTS OF 1 SYRINGE (44MCG) UNDER THE SKIN THREE TIMES WEEKLY 12 Syringe 11     No facility-administered encounter medications on file as of 7/24/2018.              Review Of Systems  Skin: As above  Eyes: negative  Ears/Nose/Throat: negative  Respiratory: No shortness of breath, dyspnea on exertion, cough, or hemoptysis  Cardiovascular: negative  Gastrointestinal: negative  Genitourinary: negative  Musculoskeletal: negative  Neurologic: negative  Psychiatric: negative  Hematologic/Lymphatic/Immunologic: negative  Endocrine: negative      O:   NAD, WDWN, Alert & Oriented, Mood & Affect wnl, Vitals stable   Here today with     /86  Pulse 56  SpO2 100%   General appearance normal   Vitals stable   Alert, oriented and in no acute distress      Brown stuck on papules, brown macules on torso and extremities    Red papules on torso   Brown macules with regular pigment network and borders on torso   Well healed scar on right chest   No visible rash seen today         The remainder of the detailed exam was unremarkable; the following areas were examined:  scalp/hair, conjunctiva/lids, face, neck, lips/teeth, oral mucosa/gingiva, chest, back, abdomen, buttocks, digits/nails, RUE, LUE, RLE, LLE.      Eyes: Conjunctivae/lids:Normal     ENT: Lips: normal    MSK:Normal    Cardiovascular: peripheral edema none    Pulm: Breathing Normal    Neuro/Psych: Orientation:Normal; Mood/Affect:Normal  A/P:  1. Polymorphic Light Eruption   No visible rash today. Based on history, clinical  sounds like PMLE.   Will check RADHA and ALONSO panel, future orders place.  Offered topical steroid but patient refused at this time.   2. History of Dysplastic Nevus on right chest  I explained the need for monthly skin exams including and taught the patient how to do this. Patient was asked about new or changing moles and a full skin exam was performed.   3. Lentigo, cherry angioma, benign nevi, seborrheic keratosis   BENIGN LESIONS DISCUSSED WITH PATIENT:  I discussed the specifics of tumor, prognosis, and genetics of benign lesions.  I explained that treatment of these lesions would be purely cosmetic and not medically neccessary.  I discussed with patient different removal options including excision, cautery and /or laser.      Nature and genetics of benign skin lesions dicussed with patient.  Signs and Symptoms of skin cancer discussed with patient.  ABCDEs of melanoma reviewed with patient.  Patient encouraged to perform monthly skin exams.  UV precautions reviewed with patient.  Risks of non-melanoma skin cancer discussed with patient   Return to clinic one year or sooner if needed.

## 2018-07-24 NOTE — LETTER
7/24/2018         RE: Andreina Duggan  [address]  [facility] [address]        Dear Colleague,    Thank you for referring your patient, Andreina Duggan, to the [facility]. Please see a copy of my visit note below.    Andreina Duggan is a 57 year old year old female patient here today for skin check. Patient notes that she had a precancerous mole on chest removed about 10 years ago since it was changing color. She reports that she has scaly areas on back then she has accidentally scratched off in the past. Andreina also notes within the past 5 years or so she has developed a sensitivity to the sun; typically seen with first few sun exposures she notes itching and rash. She reports that cortisone helps and improves after a few weeks.  Patient has no other skin complaints today.  Remainder of the HPI, Meds, PMH, Allergies, FH, and SH was reviewed in chart.    Pertinent Hx:  History of Dysplastic nevus on right chest   Past Medical History:   Diagnosis Date     Actinic keratosis      Generalised headaches     sinus headaches     History of migraine headaches      Hyperlipidaemia 2012     Multiple sclerosis (H)        Past Surgical History:   Procedure Laterality Date     COLONOSCOPY N/A 3/31/2016    Procedure: COLONOSCOPY;  Surgeon: Anuel Madrigal MD;  Location: [facility] OR     LAPAROSCOPY DIAGNOSTIC (GYN)  1980s    left ovary problems, a couple of scopes        Family History   Problem Relation Age of Onset     Diabetes Father      Hypertension Father      Breast Cancer No family hx of      Cancer - colorectal No family hx of        Social History     Social History     Marital status:      Spouse name: N/A     Number of children: 2     Years of education: N/A     Occupational History      [employer]     Social History Main Topics     Smoking status: Former Smoker     Packs/day: 0.50     Years: 30.00     Types: Cigarettes     Quit date: 4/30/2018     Smokeless tobacco: Never Used       Comment: she isn't sure when     Alcohol use 4.2 oz/week     7 Standard drinks or equivalent per week     Drug use: No     Sexual activity: Yes     Partners: Male      Comment:  one partner      Other Topics Concern     Not on file     Social History Narrative       Outpatient Encounter Prescriptions as of 7/24/2018   Medication Sig Dispense Refill     ASPIRIN PO Take 325 mg by mouth as needed for moderate pain       atorvastatin (LIPITOR) 10 MG tablet Take 1 tablet (10 mg) by mouth daily 90 tablet 3     Cholecalciferol (VITAMIN D) 2000 UNITS tablet Take 1 tablet by mouth daily       MULTIPLE VITAMIN PO Take by mouth daily       REBIF 44 MCG/0.5ML injection INJECT THE CONTENTS OF 1 SYRINGE (44MCG) UNDER THE SKIN THREE TIMES WEEKLY 12 Syringe 11     No facility-administered encounter medications on file as of 7/24/2018.              Review Of Systems  Skin: As above  Eyes: negative  Ears/Nose/Throat: negative  Respiratory: No shortness of breath, dyspnea on exertion, cough, or hemoptysis  Cardiovascular: negative  Gastrointestinal: negative  Genitourinary: negative  Musculoskeletal: negative  Neurologic: negative  Psychiatric: negative  Hematologic/Lymphatic/Immunologic: negative  Endocrine: negative      O:   NAD, WDWN, Alert & Oriented, Mood & Affect wnl, Vitals stable   Here today with     /86  Pulse 56  SpO2 100%   General appearance normal   Vitals stable   Alert, oriented and in no acute distress      Brown stuck on papules, brown macules on torso and extremities    Red papules on torso   Brown macules with regular pigment network and borders on torso   Well healed scar on right chest   No visible rash seen today         The remainder of the detailed exam was unremarkable; the following areas were examined:  scalp/hair, conjunctiva/lids, face, neck, lips/teeth, oral mucosa/gingiva, chest, back, abdomen, buttocks, digits/nails, RUE, LUE, RLE, LLE.      Eyes: Conjunctivae/lids:Normal      ENT: Lips: normal    MSK:Normal    Cardiovascular: peripheral edema none    Pulm: Breathing Normal    Neuro/Psych: Orientation:Normal; Mood/Affect:Normal  A/P:  1. Polymorphic Light Eruption   No visible rash today. Based on history, clinical sounds like PMLE.   Will check RADHA and ALONSO panel, future orders place.  Offered topical steroid but patient refused at this time.   2. History of Dysplastic Nevus on right chest  I explained the need for monthly skin exams including and taught the patient how to do this. Patient was asked about new or changing moles and a full skin exam was performed.   3. Lentigo, cherry angioma, benign nevi, seborrheic keratosis   BENIGN LESIONS DISCUSSED WITH PATIENT:  I discussed the specifics of tumor, prognosis, and genetics of benign lesions.  I explained that treatment of these lesions would be purely cosmetic and not medically neccessary.  I discussed with patient different removal options including excision, cautery and /or laser.      Nature and genetics of benign skin lesions dicussed with patient.  Signs and Symptoms of skin cancer discussed with patient.  ABCDEs of melanoma reviewed with patient.  Patient encouraged to perform monthly skin exams.  UV precautions reviewed with patient.  Risks of non-melanoma skin cancer discussed with patient   Return to clinic one year or sooner if needed.     Again, thank you for allowing me to participate in the care of your patient.        Sincerely,        Kasandra Quinones PA-C

## 2018-08-01 DIAGNOSIS — Z51.81 ENCOUNTER FOR THERAPEUTIC DRUG MONITORING: ICD-10-CM

## 2018-08-01 DIAGNOSIS — L56.4 POLYMORPHIC LIGHT ERUPTION: ICD-10-CM

## 2018-08-01 LAB
ALT SERPL W P-5'-P-CCNC: 29 U/L (ref 0–50)
AST SERPL W P-5'-P-CCNC: 19 U/L (ref 0–45)
BASOPHILS # BLD AUTO: 0 10E9/L (ref 0–0.2)
BASOPHILS NFR BLD AUTO: 0.5 %
BILIRUB SERPL-MCNC: 0.3 MG/DL (ref 0.2–1.3)
DIFFERENTIAL METHOD BLD: NORMAL
EOSINOPHIL # BLD AUTO: 0.2 10E9/L (ref 0–0.7)
EOSINOPHIL NFR BLD AUTO: 3.1 %
ERYTHROCYTE [DISTWIDTH] IN BLOOD BY AUTOMATED COUNT: 13.6 % (ref 10–15)
HCT VFR BLD AUTO: 39.7 % (ref 35–47)
HGB BLD-MCNC: 13 G/DL (ref 11.7–15.7)
LYMPHOCYTES # BLD AUTO: 2.4 10E9/L (ref 0.8–5.3)
LYMPHOCYTES NFR BLD AUTO: 40.6 %
MCH RBC QN AUTO: 29.9 PG (ref 26.5–33)
MCHC RBC AUTO-ENTMCNC: 32.7 G/DL (ref 31.5–36.5)
MCV RBC AUTO: 91 FL (ref 78–100)
MONOCYTES # BLD AUTO: 0.8 10E9/L (ref 0–1.3)
MONOCYTES NFR BLD AUTO: 13.9 %
NEUTROPHILS # BLD AUTO: 2.4 10E9/L (ref 1.6–8.3)
NEUTROPHILS NFR BLD AUTO: 41.9 %
PLATELET # BLD AUTO: 196 10E9/L (ref 150–450)
RBC # BLD AUTO: 4.35 10E12/L (ref 3.8–5.2)
WBC # BLD AUTO: 5.8 10E9/L (ref 4–11)

## 2018-08-01 PROCEDURE — 36415 COLL VENOUS BLD VENIPUNCTURE: CPT | Performed by: PSYCHIATRY & NEUROLOGY

## 2018-08-01 PROCEDURE — 86235 NUCLEAR ANTIGEN ANTIBODY: CPT | Performed by: PSYCHIATRY & NEUROLOGY

## 2018-08-01 PROCEDURE — 86038 ANTINUCLEAR ANTIBODIES: CPT | Performed by: PSYCHIATRY & NEUROLOGY

## 2018-08-01 PROCEDURE — 84460 ALANINE AMINO (ALT) (SGPT): CPT | Performed by: PSYCHIATRY & NEUROLOGY

## 2018-08-01 PROCEDURE — 84450 TRANSFERASE (AST) (SGOT): CPT | Performed by: PSYCHIATRY & NEUROLOGY

## 2018-08-01 PROCEDURE — 82247 BILIRUBIN TOTAL: CPT | Performed by: PSYCHIATRY & NEUROLOGY

## 2018-08-01 PROCEDURE — 85025 COMPLETE CBC W/AUTO DIFF WBC: CPT | Performed by: PSYCHIATRY & NEUROLOGY

## 2018-08-02 LAB
ANA SER QL IF: NEGATIVE
ENA RNP IGG SER IA-ACNC: <0.2 AI (ref 0–0.9)
ENA SCL70 IGG SER IA-ACNC: <0.2 AI (ref 0–0.9)
ENA SM IGG SER-ACNC: <0.2 AI (ref 0–0.9)
ENA SS-A IGG SER IA-ACNC: <0.2 AI (ref 0–0.9)
ENA SS-B IGG SER IA-ACNC: <0.2 AI (ref 0–0.9)

## 2018-12-06 DIAGNOSIS — E78.5 HYPERLIPIDEMIA WITH TARGET LDL LESS THAN 160: ICD-10-CM

## 2018-12-07 RX ORDER — ATORVASTATIN CALCIUM 10 MG/1
10 TABLET, FILM COATED ORAL DAILY
Qty: 30 TABLET | Refills: 0 | Status: SHIPPED | OUTPATIENT
Start: 2018-12-07 | End: 2018-12-28

## 2018-12-07 NOTE — TELEPHONE ENCOUNTER
Medication is being filled for 1 time refill only due to:  Patient needs to be seen because it has been more than one year since last visit.     Patient has upcoming appointment scheduled for the end of this month.      atorvastatin (LIPITOR) 10 MG tablet  Take 1 tablet (10 mg) by mouth daily       Disp: 90 tablet Refills: 3    Class: E-Prescribe Start: 12/6/2018   For: Hyperlipidemia with target LDL less than 160  Documented:4 years ago  Statins Protocol Rulesx16/6 9:32 AM   LDL on file in past 12 months    No abnormal creatine kinase in past 12 months    Recent (12 mo) or future (30 days) visit within the authorizing provider's specialty    Patient is age 18 or older    No active pregnancy on record    No positive pregnancy test in past 12 months     Babita Morelos RN   [facility]

## 2018-12-26 ENCOUNTER — DOCUMENTATION ONLY (OUTPATIENT)
Dept: LAB | Facility: CLINIC | Age: 58
End: 2018-12-26

## 2018-12-26 DIAGNOSIS — Z00.00 ENCOUNTER FOR PREVENTATIVE ADULT HEALTH CARE EXAMINATION: Primary | ICD-10-CM

## 2018-12-26 DIAGNOSIS — Z13.29 SCREENING FOR THYROID DISORDER: ICD-10-CM

## 2018-12-26 DIAGNOSIS — Z13.1 SCREENING FOR DIABETES MELLITUS (DM): ICD-10-CM

## 2018-12-26 DIAGNOSIS — E78.5 HYPERLIPIDEMIA WITH TARGET LDL LESS THAN 160: ICD-10-CM

## 2018-12-26 NOTE — PROGRESS NOTES
Next 5 appointments (look out 90 days)    Dec 28, 2018  8:30 AM CST  PHYSICAL with CASSIUS Ya CNP  [facility] [address] [address] [place] [phone]  [phone]        Done

## 2018-12-26 NOTE — PROGRESS NOTES
Per health maintenance, patient is up to date. Routing to provider to review and advise.     Babita Morelos RN   [facility]

## 2018-12-28 ENCOUNTER — OFFICE VISIT (OUTPATIENT)
Dept: PEDIATRICS | Facility: CLINIC | Age: 58
End: 2018-12-28
Payer: COMMERCIAL

## 2018-12-28 VITALS
TEMPERATURE: 97.9 F | SYSTOLIC BLOOD PRESSURE: 129 MMHG | BODY MASS INDEX: 22.61 KG/M2 | OXYGEN SATURATION: 97 % | HEART RATE: 58 BPM | DIASTOLIC BLOOD PRESSURE: 70 MMHG | HEIGHT: 64 IN | WEIGHT: 132.4 LBS

## 2018-12-28 DIAGNOSIS — Z00.00 ENCOUNTER FOR PREVENTATIVE ADULT HEALTH CARE EXAMINATION: ICD-10-CM

## 2018-12-28 DIAGNOSIS — Z12.39 ENCOUNTER FOR SCREENING BREAST EXAMINATION: ICD-10-CM

## 2018-12-28 DIAGNOSIS — E78.5 HYPERLIPIDEMIA WITH TARGET LDL LESS THAN 160: ICD-10-CM

## 2018-12-28 DIAGNOSIS — Z00.00 ENCOUNTER FOR PREVENTATIVE ADULT HEALTH CARE EXAMINATION: Primary | ICD-10-CM

## 2018-12-28 DIAGNOSIS — Z51.81 ENCOUNTER FOR THERAPEUTIC DRUG MONITORING: ICD-10-CM

## 2018-12-28 DIAGNOSIS — Z13.1 SCREENING FOR DIABETES MELLITUS (DM): ICD-10-CM

## 2018-12-28 DIAGNOSIS — N95.2 ATROPHIC VAGINITIS: ICD-10-CM

## 2018-12-28 DIAGNOSIS — Z13.29 SCREENING FOR THYROID DISORDER: ICD-10-CM

## 2018-12-28 DIAGNOSIS — Z12.4 SCREENING FOR MALIGNANT NEOPLASM OF CERVIX: ICD-10-CM

## 2018-12-28 LAB
ALBUMIN SERPL-MCNC: 3.7 G/DL (ref 3.4–5)
ALP SERPL-CCNC: 69 U/L (ref 40–150)
ALT SERPL W P-5'-P-CCNC: 29 U/L (ref 0–50)
ANION GAP SERPL CALCULATED.3IONS-SCNC: 7 MMOL/L (ref 3–14)
AST SERPL W P-5'-P-CCNC: 23 U/L (ref 0–45)
BASOPHILS # BLD AUTO: 0 10E9/L (ref 0–0.2)
BASOPHILS NFR BLD AUTO: 0.8 %
BILIRUB SERPL-MCNC: 0.4 MG/DL (ref 0.2–1.3)
BUN SERPL-MCNC: 21 MG/DL (ref 7–30)
CALCIUM SERPL-MCNC: 9.8 MG/DL (ref 8.5–10.1)
CHLORIDE SERPL-SCNC: 107 MMOL/L (ref 94–109)
CHOLEST SERPL-MCNC: 209 MG/DL
CO2 SERPL-SCNC: 28 MMOL/L (ref 20–32)
CREAT SERPL-MCNC: 0.82 MG/DL (ref 0.52–1.04)
DIFFERENTIAL METHOD BLD: ABNORMAL
EOSINOPHIL # BLD AUTO: 0.2 10E9/L (ref 0–0.7)
EOSINOPHIL NFR BLD AUTO: 5 %
ERYTHROCYTE [DISTWIDTH] IN BLOOD BY AUTOMATED COUNT: 12.9 % (ref 10–15)
GFR SERPL CREATININE-BSD FRML MDRD: 80 ML/MIN/{1.73_M2}
GLUCOSE SERPL-MCNC: 100 MG/DL (ref 70–99)
HCT VFR BLD AUTO: 40.7 % (ref 35–47)
HDLC SERPL-MCNC: 58 MG/DL
HGB BLD-MCNC: 13 G/DL (ref 11.7–15.7)
IMM GRANULOCYTES # BLD: 0 10E9/L (ref 0–0.4)
IMM GRANULOCYTES NFR BLD: 0.5 %
LDLC SERPL CALC-MCNC: 127 MG/DL
LYMPHOCYTES # BLD AUTO: 1.4 10E9/L (ref 0.8–5.3)
LYMPHOCYTES NFR BLD AUTO: 36.8 %
MCH RBC QN AUTO: 29.1 PG (ref 26.5–33)
MCHC RBC AUTO-ENTMCNC: 31.9 G/DL (ref 31.5–36.5)
MCV RBC AUTO: 91 FL (ref 78–100)
MONOCYTES # BLD AUTO: 0.6 10E9/L (ref 0–1.3)
MONOCYTES NFR BLD AUTO: 14.6 %
NEUTROPHILS # BLD AUTO: 1.6 10E9/L (ref 1.6–8.3)
NEUTROPHILS NFR BLD AUTO: 42.3 %
NONHDLC SERPL-MCNC: 151 MG/DL
PLATELET # BLD AUTO: 198 10E9/L (ref 150–450)
POTASSIUM SERPL-SCNC: 4.2 MMOL/L (ref 3.4–5.3)
PROT SERPL-MCNC: 7.3 G/DL (ref 6.8–8.8)
RBC # BLD AUTO: 4.47 10E12/L (ref 3.8–5.2)
SODIUM SERPL-SCNC: 142 MMOL/L (ref 133–144)
TRIGL SERPL-MCNC: 119 MG/DL
TSH SERPL DL<=0.005 MIU/L-ACNC: 1.89 MU/L (ref 0.4–4)
WBC # BLD AUTO: 3.8 10E9/L (ref 4–11)

## 2018-12-28 PROCEDURE — 36415 COLL VENOUS BLD VENIPUNCTURE: CPT | Performed by: NURSE PRACTITIONER

## 2018-12-28 PROCEDURE — 80061 LIPID PANEL: CPT | Performed by: NURSE PRACTITIONER

## 2018-12-28 PROCEDURE — 84443 ASSAY THYROID STIM HORMONE: CPT | Performed by: NURSE PRACTITIONER

## 2018-12-28 PROCEDURE — G0145 SCR C/V CYTO,THINLAYER,RESCR: HCPCS | Performed by: NURSE PRACTITIONER

## 2018-12-28 PROCEDURE — 87624 HPV HI-RISK TYP POOLED RSLT: CPT | Performed by: NURSE PRACTITIONER

## 2018-12-28 PROCEDURE — 99396 PREV VISIT EST AGE 40-64: CPT | Performed by: NURSE PRACTITIONER

## 2018-12-28 PROCEDURE — 85025 COMPLETE CBC W/AUTO DIFF WBC: CPT | Performed by: NURSE PRACTITIONER

## 2018-12-28 PROCEDURE — 80053 COMPREHEN METABOLIC PANEL: CPT | Performed by: NURSE PRACTITIONER

## 2018-12-28 RX ORDER — RENAGEL 800 MG/1
TABLET ORAL
COMMUNITY
Start: 2018-12-12

## 2018-12-28 RX ORDER — ATORVASTATIN CALCIUM 10 MG/1
10 TABLET, FILM COATED ORAL DAILY
Qty: 30 TABLET | Refills: 0 | Status: SHIPPED | OUTPATIENT
Start: 2018-12-28 | End: 2018-12-28

## 2018-12-28 RX ORDER — ATORVASTATIN CALCIUM 10 MG/1
10 TABLET, FILM COATED ORAL DAILY
Qty: 90 TABLET | Refills: 3 | Status: SHIPPED | OUTPATIENT
Start: 2018-12-28 | End: 2020-01-27

## 2018-12-28 ASSESSMENT — MIFFLIN-ST. JEOR: SCORE: 1179.53

## 2018-12-28 NOTE — PATIENT INSTRUCTIONS
PLAN:   1.   Symptomatic therapy suggested: Increase calcium to 1000mg and 800iu Vit D  Continue current medications.    2.  Orders Placed This Encounter   Medications     AUBAGIO 14 MG tablet     Si tablet daily     DISCONTD: atorvastatin (LIPITOR) 10 MG tablet     Sig: Take 1 tablet (10 mg) by mouth daily     Dispense:  30 tablet     Refill:  0     atorvastatin (LIPITOR) 10 MG tablet     Sig: Take 1 tablet (10 mg) by mouth daily     Dispense:  90 tablet     Refill:  3     conjugated estrogens (PREMARIN) 0.625 MG/GM vaginal cream     Sig: Place 0.5 g vaginally twice a week     Dispense:  30 g     Refill:  3     Orders Placed This Encounter   Procedures     MA Screening Digital Bilateral     Pap imaged thin layer screen with HPV - recommended age 30 - 65 years (select HPV order below)     HPV High Risk Types DNA Cervical       3. Patient needs to follow up in if no improvement,or sooner if worsening of symptoms or other symptoms develop.  FURTHER TESTING:       - mammogram  Will follow up and/or notify patient of  results via My Chart to determine further need for followup  Follow up office visit in one year for annual health maintenance exam, sooner PRN.  Initiated consultation with ELVER Bailey, Behavioral Health Clinician for mental health counseling.     Preventive Health Recommendations  Female Ages 50 - 64    Yearly exam: See your health care provider every year in order to  o Review health changes.   o Discuss preventive care.    o Review your medicines if your doctor has prescribed any.      Get a Pap test every three years (unless you have an abnormal result and your provider advises testing more often).    If you get Pap tests with HPV test, you only need to test every 5 years, unless you have an abnormal result.     You do not need a Pap test if your uterus was removed (hysterectomy) and you have not had cancer.    You should be tested each year for STDs (sexually transmitted diseases) if  you're at risk.     Have a mammogram every 1 to 2 years.    Have a colonoscopy at age 50, or have a yearly FIT test (stool test). These exams screen for colon cancer.      Have a cholesterol test every 5 years, or more often if advised.    Have a diabetes test (fasting glucose) every three years. If you are at risk for diabetes, you should have this test more often.     If you are at risk for osteoporosis (brittle bone disease), think about having a bone density scan (DEXA).    Shots: Get a flu shot each year. Get a tetanus shot every 10 years.    Nutrition:     Eat at least 5 servings of fruits and vegetables each day.    Eat whole-grain bread, whole-wheat pasta and brown rice instead of white grains and rice.    Get adequate Calcium and Vitamin D.     Lifestyle    Exercise at least 150 minutes a week (30 minutes a day, 5 days a week). This will help you control your weight and prevent disease.    Limit alcohol to one drink per day.    No smoking.     Wear sunscreen to prevent skin cancer.     See your dentist every six months for an exam and cleaning.    See your eye doctor every 1 to 2 years.    Patient Education     Atrophic Vaginitis    Atrophic vaginitis means the walls of your vagina have become thin. This happens when your body makes too little of the hormone estrogen. Menopause or surgical removal of the ovaries are the most common causes for a drop in estrogen. Breastfeeding can also cause the hormone level to drop.  Symptoms of atrophic vaginitis include:    Dryness, soreness, burning, or itching in the vagina    Vaginal discharge  Sex can be uncomfortable, even painful. After sex, you may have bleeding from your vagina. You may also have burning or pain when you urinate.  Home care  Your healthcare provider may recommend 1 or more of these as treatment:    Vaginal creams, lotions, and lubricants. These products help relieve vaginal dryness. They don t need a prescription. They can be found in the  personal care section of most pharmacies. Creams and lotions are used daily to help keep the vagina moist. Lubricants help reduce dryness and pain during sex. Choose water-based lubricants. Don t use petroleum jelly, mineral oil, or other oils. These increase the chance of infection.     Hormone therapy (HT). HT increases the amount of estrogen in your body. This can help manage or relieve symptoms. HT can be given in pill form. It may be given as a lotion, cream, ring put into the vagina, or a patch on the skin. The risks and benefits of HT vary for each woman. For instance, your risk may be higher if you have had breast cancer. Discuss this treatment with your healthcare provider. Not every woman can use HT.  You don t need to give up (abstain from) sex. In fact, regular sex can help keep vaginal tissues healthy. Take steps to make sex more comfortable by using water-based lubricants.  Preventing infections  Atrophic vaginitis makes an infection of the vagina or the urinary tract more likely. To help reduce your risk:    Keep your genitals clean. When you bathe, wash the outside of your vagina with mild soap and water. Clean gently between the folds of your vagina.    Wipe from front to back after a bowel movement.    Don t douche unless your healthcare provider tells you to.    Avoid scented toilet paper, scented vaginal sprays, and scented tampons.    Avoid wearing clothes that are tight in the crotch. These include pantyhose, jeans, and leggings. Wear cotton underwear. Change it every day.  Follow-up care  Follow up with your healthcare provider, or as advised.  When to seek medical advice  Call your health care provider right away if any of these occur:    Fever of 100.4 F (38 C) or higher, or as directed by your healthcare provider    Symptoms don t go away or get worse even with treatment    Vaginal area swells or becomes painful    Vaginal area bleeds, but not because of your period    Bad-smelling  discharge from the vagina    Pain or burning when you urinate or you have trouble passing urine    Open sores develop around vagina   Date Last Reviewed: 12/1/2016 2000-2018 [employer]. [address] [place] [address], [place] [address]. All rights reserved. This information is not intended as a substitute for professional medical care. Always follow your healthcare professional's instructions.

## 2018-12-28 NOTE — NURSING NOTE
"Chief Complaint   Patient presents with     Physical     AFE       Initial /70 (BP Location: Right arm, Patient Position: Sitting, Cuff Size: Adult Regular)   Pulse 58   Temp 97.9  F (36.6  C) (Temporal)   Ht 1.632 m (5' 4.25\")   Wt 60.1 kg (132 lb 6.4 oz)   LMP 02/28/2014   SpO2 97%   Breastfeeding? No   BMI 22.55 kg/m   Estimated body mass index is 22.55 kg/m  as calculated from the following:    Height as of this encounter: 1.632 m (5' 4.25\").    Weight as of this encounter: 60.1 kg (132 lb 6.4 oz).  Medication Reconciliation: complete      LAMAR Mathew      "

## 2018-12-28 NOTE — PROGRESS NOTES
SUBJECTIVE:   CC: Andreina Duggan is an 58 year old woman who presents for preventive health visit.     Physical   Annual:     Getting at least 3 servings of Calcium per day:  Yes    Bi-annual eye exam:  NO    Dental care twice a year:  Yes    Sleep apnea or symptoms of sleep apnea:  None    Diet:  Regular (no restrictions)    Frequency of exercise:  1 day/week    Duration of exercise:  15-30 minutes    Taking medications regularly:  Yes    Medication side effects:  Not applicable    Additional concerns today:  Yes    PHQ-2 Total Score: 2      Hyperlipidemia Follow-Up      Rate your low fat/cholesterol diet?: not monitoring fat    Taking statin?  Yes, no muscle aches from statin    Other lipid medications/supplements?:  none      Today's PHQ-2 Score:   PHQ-2 (  [employer]) 2018   Q1: Little interest or pleasure in doing things 1   Q2: Feeling down, depressed or hopeless 1   PHQ-2 Score 2   Q1: Little interest or pleasure in doing things Several days   Q2: Feeling down, depressed or hopeless Several days   PHQ-2 Score 2       Abuse: Current or Past(Physical, Sexual or Emotional)- No  Do you feel safe in your environment? Yes    Social History     Tobacco Use     Smoking status: Former Smoker     Packs/day: 0.50     Years: 5.00     Pack years: 2.50     Types: Cigarettes     Last attempt to quit: 2018     Years since quittin.6     Smokeless tobacco: Never Used     Tobacco comment: she isn't sure when   Substance Use Topics     Alcohol use: Yes     Alcohol/week: 4.2 oz     Alcohol Use 2018   If you drink alcohol do you typically have greater than 3 drinks per day OR greater than 7 drinks per week? No   No flowsheet data found.    Reviewed orders with patient.  Reviewed health maintenance and updated orders accordingly - Yes  Labs reviewed in EPIC  BP Readings from Last 3 Encounters:   18 129/70   18 144/86   17 117/75    Wt Readings from Last 3 Encounters:   18 60.1 kg  (132 lb 6.4 oz)   17 57.2 kg (126 lb)   16 57.6 kg (127 lb)                  Patient Active Problem List   Diagnosis     MS (multiple sclerosis) (H)     Hyperlipidemia with target LDL less than 160     Past Surgical History:   Procedure Laterality Date     COLONOSCOPY N/A 3/31/2016    Procedure: COLONOSCOPY;  Surgeon: Anuel Madrigal MD;  Location: [facility] OR     LAPAROSCOPY DIAGNOSTIC (GYN)  1980s    left ovary problems, a couple of scopes       Social History     Tobacco Use     Smoking status: Former Smoker     Packs/day: 0.50     Years: 5.00     Pack years: 2.50     Types: Cigarettes     Last attempt to quit: 2018     Years since quittin.6     Smokeless tobacco: Never Used     Tobacco comment: she isn't sure when   Substance Use Topics     Alcohol use: Yes     Alcohol/week: 4.2 oz     Family History   Problem Relation Age of Onset     Diabetes Father      Hypertension Father         Now      Diabetes Brother      Hyperlipidemia Mother      Anxiety Disorder Sister      Thyroid Disease Sister         Thyroid has been removed     Breast Cancer No family hx of      Cancer - colorectal No family hx of          Current Outpatient Medications   Medication Sig Dispense Refill     ASPIRIN PO Take 325 mg by mouth as needed for moderate pain       atorvastatin (LIPITOR) 10 MG tablet Take 1 tablet (10 mg) by mouth daily 90 tablet 3     AUBAGIO 14 MG tablet 1 tablet daily       Cholecalciferol (VITAMIN D) 2000 UNITS tablet Take 1 tablet by mouth daily       MULTIPLE VITAMIN PO Take by mouth daily       No Known Allergies    Mammogram Screening: Patient over age 50, mutual decision to screen reflected in health maintenance.    Pertinent mammograms are reviewed under the imaging tab.  History of abnormal Pap smear: NO - age 30- 65 PAP every 3 years recommended     Reviewed and updated as needed this visit by clinical staff  Tobacco  Allergies  Meds  Med Hx  Surg Hx  Fam Hx  Soc Hx     "    Reviewed and updated as needed this visit by Provider        Past Medical History:   Diagnosis Date     Actinic keratosis      Generalised headaches     sinus headaches     History of migraine headaches      Hyperlipidaemia 2012     Multiple sclerosis (H)       Past Surgical History:   Procedure Laterality Date     COLONOSCOPY N/A 3/31/2016    Procedure: COLONOSCOPY;  Surgeon: Anuel Madrigal MD;  Location: [facility]     LAPAROSCOPY DIAGNOSTIC (GYN)  1980s    left ovary problems, a couple of scopes       Review of Systems  CONSTITUTIONAL:NEGATIVE for fever, chills, change in weight  INTEGUMENTARY/SKIN: NEGATIVE for worrisome rashes, moles or lesions  EYES: NEGATIVE for vision changes or irritation  ENT: NEGATIVE for ear, mouth and throat problems  RESP:NEGATIVE for significant cough or SOB  BREAST: NEGATIVE for masses, tenderness or discharge  CV: NEGATIVE for chest pain, palpitations or peripheral edema  GI: NEGATIVE for nausea, abdominal pain, heartburn, or change in bowel habits   menopausal female: amenorrhea, no unusual urinary symptoms, no unusual vaginal symptoms and vaginal dryness  MUSCULOSKELETAL:NEGATIVE for significant arthralgias or myalgia  NEURO: NEGATIVE for weakness, dizziness or paresthesias  ENDOCRINE: NEGATIVE for temperature intolerance, skin/hair changes  HEME/ALLERGY/IMMUNE: NEGATIVE for bleeding problems  PSYCHIATRIC: POSITIVE fordepressed mood mild periodically. Not worse in the winter and  Does sometimes drinks more than what she thinks she should be about once a week. NEGATIVE forsuicidal thoughts with out a plan and thoughts of self harm      OBJECTIVE:   /70 (BP Location: Right arm, Patient Position: Sitting, Cuff Size: Adult Regular)   Pulse 58   Temp 97.9  F (36.6  C) (Temporal)   Ht 1.632 m (5' 4.25\")   Wt 60.1 kg (132 lb 6.4 oz)   LMP 02/28/2014   SpO2 97%   Breastfeeding? No   BMI 22.55 kg/m     Wt Readings from Last 4 Encounters:   12/28/18 60.1 kg (132 lb " 6.4 oz)   09/21/17 57.2 kg (126 lb)   03/23/16 57.6 kg (127 lb)   03/25/16 57.6 kg (127 lb)       Physical Exam  GENERAL: healthy, alert and no distress  EYES: Eyes grossly normal to inspection, PERRL and conjunctivae and sclerae normal  HENT: ear canals and TM's normal, nose and mouth without ulcers or lesions  NECK: no adenopathy, no asymmetry, masses, or scars and thyroid normal to palpation  RESP: lungs clear to auscultation - no rales, rhonchi or wheezes  BREAST: normal without masses, tenderness or nipple discharge and no palpable axillary masses or adenopathy  CV: regular rates and rhythm, no murmur, click or rub, peripheral pulses strong and no peripheral edema  ABDOMEN: soft, nontender, no hepatosplenomegaly, no masses and bowel sounds normal   (female): normal female external genitalia, normal urethral meatus , vaginal mucosal atrophy and normal cervix, adnexae, and uterus without masses.  MS: no gross musculoskeletal defects noted, no edema  SKIN: no suspicious lesions or rashes  NEURO: Normal strength and tone, mentation intact and speech normal  PSYCH: mentation appears normal, affect normal/bright  LYMPH: no cervical, supraclavicular, axillary, or inguinal adenopathy    Diagnostic Test Results:  Results for orders placed or performed in visit on 12/28/18   Pap imaged thin layer screen with HPV - recommended age 30 - 65 years (select HPV order below)   Result Value Ref Range    PAP NIL     Copath Report         Patient Name: AIXA FIGUEROA  MR#: [id number]  Specimen #: [id number]  Collected: 12/28/2018  Received: 12/31/2018  Reported: 1/2/2019 09:56  Ordering Phy(s): NINOSKA WEN    For improved result formatting, select 'View Enhanced Report Format' under   Linked Documents section.    SPECIMEN/STAIN PROCESS:  Pap imaged thin layer prep screening (Surepath, FocalPoint with guided   screening)       Pap-Cyto x 1, HPV ordered x 1    SOURCE: Cervical,  endocervical  ----------------------------------------------------------------   Pap imaged thin layer prep screening (Surepath, FocalPoint with guided   screening)  SPECIMEN ADEQUACY:  Satisfactory for evaluation.  -Transformation zone component present.    CYTOLOGIC INTERPRETATION:    Negative for intraepithelial lesion or malignancy    Electronically signed out by:  PAVAN Allan (ASCP)    Processed and screened at [facility]    CLINICAL HISTORY:  LMP: 2/28/14  Post Men opausal,    Papanicolaou Test Limitations:  Cervical cytology is a screening test with   limited sensitivity; regular  screening is critical for cancer prevention; Pap tests are primarily   effective for the diagnosis/prevention of  squamous cell carcinoma, not adenocarcinomas or other cancers.    TESTING LAB LOCATION:  [facility], [address]  [address] [phone]    COLLECTION SITE:  Client:  [facility]  [place] [facility] [place]       HPV High Risk Types DNA Cervical   Result Value Ref Range    HPV Source SurePath     HPV 16 DNA Negative NEG^Negative    HPV 18 DNA Negative NEG^Negative    Other HR HPV Negative NEG^Negative    Final Diagnosis This patient's sample is negative for HPV DNA.     Specimen Description Cervical Cells        ASSESSMENT/PLAN:   Andreina was seen today for physical.    Diagnoses and all orders for this visit:    Encounter for preventative adult health care examination    Hyperlipidemia with target LDL less than 160  -     Discontinue: atorvastatin (LIPITOR) 10 MG tablet; Take 1 tablet (10 mg) by mouth daily  -     atorvastatin (LIPITOR) 10 MG tablet; Take 1 tablet (10 mg) by mouth daily    Encounter for screening breast examination  -     MA Screening Digital Bilateral; Future    Screening for malignant neoplasm of cervix  -     Pap imaged thin  "layer screen with HPV - recommended age 30 - 65 years (select HPV order below)  -     HPV High Risk Types DNA Cervical    Atrophic vaginitis  -     conjugated estrogens (PREMARIN) 0.625 MG/GM vaginal cream; Place 0.5 g vaginally twice a week      PLAN:    Patient needs to follow up in if no improvement,or sooner if worsening of symptoms or other symptoms develop.  FURTHER TESTING:       - mammogram  Will follow up and/or notify patient of  results via My Chart to determine further need for followup  Follow up office visit in one year for annual health maintenance exam, sooner PRN.  Initiated consultation with Roxy Guerrero [facility], Behavioral Health Clinician for mental health counseling.       COUNSELING:  Reviewed preventive health counseling, as reflected in patient instructions  Special attention given to:        Regular exercise       Healthy diet/nutrition       Osteoporosis Prevention/Bone Health       Colon cancer screening       Consider Hep C screening for patients born between 1945 and        HIV screeninx in teen years, 1x in adult years, and at intervals if high risk       The 10-year ASCVD risk score (Janay ABERNATHY Jr., et al., 2013) is: 2.2%    Values used to calculate the score:      Age: 58 years      Sex: Female      Is Non- : No      Diabetic: No      Tobacco smoker: No      Systolic Blood Pressure: 129 mmHg      Is BP treated: No      HDL Cholesterol: 58 mg/dL      Total Cholesterol: 209 mg/dL    BP Readings from Last 1 Encounters:   18 129/70     Estimated body mass index is 22.55 kg/m  as calculated from the following:    Height as of this encounter: 1.632 m (5' 4.25\").    Weight as of this encounter: 60.1 kg (132 lb 6.4 oz).    BP Screening:   Last 3 BP Readings:    BP Readings from Last 3 Encounters:   18 129/70   18 144/86   17 117/75       The following was recommended to the patient:  Re-screen BP within a year and recommended lifestyle " modifications  Weight management plan: Discussed healthy diet and exercise guidelines     reports that she quit smoking about 7 months ago. Her smoking use included cigarettes. She has a 2.50 pack-year smoking history. she has never used smokeless tobacco.      Counseling Resources:  ATP IV Guidelines  Pooled Cohorts Equation Calculator  Breast Cancer Risk Calculator  FRAX Risk Assessment  ICSI Preventive Guidelines  Dietary Guidelines for Americans, 2010  USDA's MyPlate  ASA Prophylaxis  Lung CA Screening    CASSIUS Ya CNP  [facility]

## 2018-12-29 NOTE — RESULT ENCOUNTER NOTE
Grace Duggan,    Attached are your test results.  -LDL(bad) cholesterol level is elevated which can increase your heart disease risk.  A diet high in fat and simple carbohydrates, genetics and being overweight can contribute to this. ADVISE: exercising 150 minutes of aerobic exercise per week (30 minutes for 5 days per week or 50 minutes for 3 days per week are options) and eating a low saturated fat/low carbohydrate diet are helpful to improve this. In 12 months, you should recheck your fasting cholesterol panel by scheduling a lab-only appointment.  -Liver and gallbladder tests are normal (ALT,AST, Alk phos, bilirubin), kidney function is normal (Cr, GFR), sodium is normal, potassium is normal, calcium is normal, glucose is normal.  -TSH (thyroid stimulating hormone) level is normal which indicates normal thyroid function.   Please contact us if you have any questions.    Marilyn Rose, CNP

## 2019-01-02 LAB
COPATH REPORT: NORMAL
PAP: NORMAL

## 2019-01-03 LAB
FINAL DIAGNOSIS: NORMAL
HPV HR 12 DNA CVX QL NAA+PROBE: NEGATIVE
HPV16 DNA SPEC QL NAA+PROBE: NEGATIVE
HPV18 DNA SPEC QL NAA+PROBE: NEGATIVE
SPECIMEN DESCRIPTION: NORMAL
SPECIMEN SOURCE CVX/VAG CYTO: NORMAL

## 2019-01-14 ENCOUNTER — ANCILLARY PROCEDURE (OUTPATIENT)
Dept: MAMMOGRAPHY | Facility: CLINIC | Age: 59
End: 2019-01-14
Payer: COMMERCIAL

## 2019-01-14 DIAGNOSIS — Z12.39 ENCOUNTER FOR SCREENING BREAST EXAMINATION: ICD-10-CM

## 2019-01-14 PROCEDURE — 77067 SCR MAMMO BI INCL CAD: CPT

## 2019-06-18 DIAGNOSIS — Z51.81 ENCOUNTER FOR THERAPEUTIC DRUG MONITORING: ICD-10-CM

## 2019-06-18 LAB
ALT SERPL W P-5'-P-CCNC: 26 U/L (ref 0–50)
AST SERPL W P-5'-P-CCNC: 19 U/L (ref 0–45)
BASOPHILS # BLD AUTO: 0 10E9/L (ref 0–0.2)
BASOPHILS NFR BLD AUTO: 1 %
BILIRUB SERPL-MCNC: 0.5 MG/DL (ref 0.2–1.3)
DIFFERENTIAL METHOD BLD: NORMAL
EOSINOPHIL # BLD AUTO: 0.1 10E9/L (ref 0–0.7)
EOSINOPHIL NFR BLD AUTO: 3.5 %
ERYTHROCYTE [DISTWIDTH] IN BLOOD BY AUTOMATED COUNT: 13.3 % (ref 10–15)
HCT VFR BLD AUTO: 40.5 % (ref 35–47)
HGB BLD-MCNC: 12.9 G/DL (ref 11.7–15.7)
IMM GRANULOCYTES # BLD: 0 10E9/L (ref 0–0.4)
IMM GRANULOCYTES NFR BLD: 0 %
LYMPHOCYTES # BLD AUTO: 1.3 10E9/L (ref 0.8–5.3)
LYMPHOCYTES NFR BLD AUTO: 32 %
MCH RBC QN AUTO: 29.1 PG (ref 26.5–33)
MCHC RBC AUTO-ENTMCNC: 31.9 G/DL (ref 31.5–36.5)
MCV RBC AUTO: 91 FL (ref 78–100)
MONOCYTES # BLD AUTO: 0.7 10E9/L (ref 0–1.3)
MONOCYTES NFR BLD AUTO: 16.9 %
NEUTROPHILS # BLD AUTO: 1.9 10E9/L (ref 1.6–8.3)
NEUTROPHILS NFR BLD AUTO: 46.6 %
PLATELET # BLD AUTO: 191 10E9/L (ref 150–450)
RBC # BLD AUTO: 4.44 10E12/L (ref 3.8–5.2)
WBC # BLD AUTO: 4 10E9/L (ref 4–11)

## 2019-06-18 PROCEDURE — 84450 TRANSFERASE (AST) (SGOT): CPT | Performed by: PSYCHIATRY & NEUROLOGY

## 2019-06-18 PROCEDURE — 82247 BILIRUBIN TOTAL: CPT | Performed by: PSYCHIATRY & NEUROLOGY

## 2019-06-18 PROCEDURE — 36415 COLL VENOUS BLD VENIPUNCTURE: CPT | Performed by: PSYCHIATRY & NEUROLOGY

## 2019-06-18 PROCEDURE — 84460 ALANINE AMINO (ALT) (SGPT): CPT | Performed by: PSYCHIATRY & NEUROLOGY

## 2019-06-18 PROCEDURE — 85025 COMPLETE CBC W/AUTO DIFF WBC: CPT | Performed by: PSYCHIATRY & NEUROLOGY

## 2019-08-13 ENCOUNTER — OFFICE VISIT (OUTPATIENT)
Dept: PSYCHOLOGY | Facility: CLINIC | Age: 59
End: 2019-08-13
Payer: COMMERCIAL

## 2019-08-13 DIAGNOSIS — F41.9 ANXIETY AND DEPRESSION: Primary | ICD-10-CM

## 2019-08-13 DIAGNOSIS — F32.A ANXIETY AND DEPRESSION: Primary | ICD-10-CM

## 2019-08-13 PROCEDURE — 90791 PSYCH DIAGNOSTIC EVALUATION: CPT | Performed by: PSYCHOLOGIST

## 2019-08-13 NOTE — PROGRESS NOTES
Adult Intake Structured Interview  Standard Diagnostic Assessment      CLIENT'S NAME: Andreina Duggan  MRN:   [id number]  :   1960  ACCT. NUMBER: [id number]  DATE OF SERVICE: 19  VIDEO VISIT: No    Identifying Information:  Client is a 58 year old, ,  female. Client was referred for counseling by self and Marilyn Rose at [facility]. 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 Client attended the session alone.     Client's Statement of Presenting Concern:  Client reports the reason for seeking therapy at this time as her feelings of hopelessness, worthlessness and lack of confidence come and go. She is also concerned that she doesn't feel anything about death - at her father's and sister's death.  Client stated that her symptoms have resulted in the following functional impairments: health maintenance, relationship(s) and social interactions    History of Presenting Concern:  Client reports that these problem(s) began most of her life. Client has attempted to resolve these concerns in the past through self help. Client reports that other professional(s) are involved in providing support / services.     Social History:  Client reported she grew up in [place], [place]. They were the sixth born of 6 children. This is an intact family and parents remain  until her father's death about 5 years ago. Client reported that her childhood was - she did not answer but talked about being the youngest that no one believes her and she became a people pleasure to cope. Client described her current relationships with family of origin as tense especially with her Nondenominational brother.    Client reported a history of 1  committed  marriage. Client has been  for 36 years to [place]. Client reported having 2 children and 6 grandchildren. One child is in the  and moves around with his family of one child. Client identified few stable and meaningful social connections. Client reported that she has not been involved with the legal system.  Client's highest education level was high school graduate. Client did not identify any learning problems. There are no ethnic, cultural or Cheondoism factors that may be relevant for therapy. Client identified her preferred language to be English. Client reported she does not need the assistance of an  or other support involved in therapy. Modifications will not be used to assist communication in therapy. Client did not serve in the .     Client reports family history includes Anxiety Disorder in her sister; Diabetes in her brother and father; Hyperlipidemia in her mother; Hypertension in her father; Thyroid Disease in her sister.    Mental Health History:  Client reported the following biological family members or relatives with mental health issues: 2 sisters and possibly dad experienced Anxiety.  Client has not been previously diagnosed with a mental health diagnosis.  Client has not received mental health services in the past.  Hospitalizations: None.  Client is not currently receiving any mental health services.    Chemical Health History:  Client reported the following biological family members or relatives with chemical health issues:  used to drink a lot daily but not for 2 years. Client has not received chemical dependency treatment in the past. Client is not currently receiving any chemical dependency treatment. Client reported the following problems as a result of drinking: sometimes not remebering everything she said the next day. Feels some guilt that she drinks and her spouse doesn't.    Client Reports:  Client reports using alcohol 2 times per week  and has 4-6 glasses of wine at a time. Patient first started drinking at age 19.  Patient reported date of last use was this week.  Patient reports heaviest use is current use.  Client denies using tobacco. She wrote that she quit a year ago.  Client reports using marijuana 1 times per day and smokes 3 0ne-hits at a time. Patient started using marijuana at age 19.  Patient reports last use was ?  Client reports using caffeine 2 times per day and drinks 1 at a time. Patient started using caffeine at age ?.  Client denies using street drugs.  Client denies the non-medical use of prescription or over the counter drugs.    CAGE: G     Patient felt bad or GUILTY about their drinking (or drug use).   Based on the negative Cage-Aid score and clinical interview there  are not indications of drug or alcohol abuse.    Discussed the general effects of drugs and alcohol on health and well-being.     Significant Losses / Trauma / Abuse / Neglect Issues:  There are indications or report of significant loss, trauma, abuse or neglect issues related to: death of her brother-in-law 5 years ago, then her father. her sister's death 1 year ago and major medical problems - MS as of 2001, but it is regulated by meds.    Issues of possible neglect are not present.    Medical Issues:  Client has had a physical exam to rule out medical causes for current symptoms. Date of last physical exam was within the past year. Client was encouraged to follow up with PCP if symptoms were to develop. The client has a [facility] Primary Care Provider, who is named Marilyn Rose.. The client reports not having a psychiatrist. Client reports the following current medical concerns: MS and a history of ovarian cysts. The client reports the presence of chronic or episodic pain in the form of hip and leg pain. The pain level is mild - - moderate and has a frequency of occassionally.. There are not significant nutritional concerns.     Client reports  "current meds as:   Current Outpatient Medications   Medication Sig     ASPIRIN PO Take 325 mg by mouth as needed for moderate pain     atorvastatin (LIPITOR) 10 MG tablet Take 1 tablet (10 mg) by mouth daily     AUBAGIO 14 MG tablet 1 tablet daily     Cholecalciferol (VITAMIN D) 2000 UNITS tablet Take 1 tablet by mouth daily     conjugated estrogens (PREMARIN) 0.625 MG/GM vaginal cream Place 0.5 g vaginally twice a week     MULTIPLE VITAMIN PO Take by mouth daily     No current facility-administered medications for this visit.        Client Allergies:  No Known Allergies  no known allergies to medications    Medical History:  Past Medical History:   Diagnosis Date     Actinic keratosis      Generalised headaches     sinus headaches     History of migraine headaches      Hyperlipidaemia 2012     Multiple sclerosis (H)          Medication Adherence:  N/A - Client does not have prescribed psychiatric medications.    Client was provided recommendation to follow-up with prescribing physician.    Mental Status Assessment:  Appearance:   Appropriate   Eye Contact:   Good   Psychomotor Behavior: Normal   Attitude:   Cooperative   Orientation:   All  Speech   Rate / Production: Normal    Volume:  Normal   Mood:    Anxious  Normal  Affect:    Appropriate   Thought Content:  Clear   Thought Form:  Coherent  Logical   Insight:    Good       Review of Symptoms:  Depression: Sleep Interest Guilt Energy Concentration Appetite Hopeless Helpless Worthless Ruminations Irritability low mood and occasional thoughts of \"this world is better off without me.  Gricelda:  No symptoms impulsive buying but not manic  Psychosis: No symptoms  Anxiety: Worries Nervousness Usual Triggers: family, afraid something bad will happen, restless and easily annoyed or minimal feelings     Panic:  No symptoms  Post Traumatic Stress Disorder: No symptoms  Obsessive Compulsive Disorder: No symptoms  Eating Disorder: No symptoms  Oppositional Defiant " "Disorder: No symptoms  ADD / ADHD: No symptoms  Conduct Disorder: No symptoms      Safety Assessment:    History of Safety Concerns:   Client reported a history of suicidal ideation.  Onset: unsure and frequency: occassionally.  Client identified the following triggers to suicidal ideation: family stress  Client denied a history of suicide attempts.    Client denied a history of homicidal ideation.    Client denied a history of self-injurious ideation and behaviors.    Client denied a history of personal safety concerns.    Client denied a history of assaultive behaviors.        Current Safety Concerns:  Client reports current suicidal ideation.  Onset: ?, frequency: occassionally duration: fleeting, intensity: mild.  Client denies intention to act on suicidal thoughts.  Client denies having a suicide plan.  .  Client identifies triggers to suicidal ideation as: family stress - thinks they are better off without her or \"no one is gonna miss me.\".       Client denies current homicidal ideation and behaviors.  Client denies current self-injurious ideation and behaviors.    Client denies current concerns for personal safety.    Client reports the following protective factors: positive relationships positive family connections, dedication to family/friends, agreement to use safety plan, living with other people, daily obligations, structured day, effective problem-solving skills, committment to well-being, positive social skills and healthy fear of risky behaviors or pain    Client reports there are no firearms in the house.     Plan for Safety and Risk Management:  Recommended that patient call 911 or go to the local ED should there be a change in any of these risk factors.    Client's Strengths and Limitations:  Client identified the following strengths or resources that will help her succeed in counseling: friends / good social support, intelligence and grandkids. Client identified the following supports: family, " friends and grandkids. Things that may interfere with the client's success in counseling include: not sure she needs it..      Diagnostic Criteria:  depression with anxiety, may just start crying, feels a lump in her throat, hopeless and worthless often.      Functional Status:  Client's symptoms are causing reduced functional status in the following areas: Activities of Daily Living - affects sleep, eating, self worth  Social / Relational - less oconfidence and tends to be a people pleaser      DSM5 Diagnoses: (Sustained by DSM5 Criteria Listed Above)  Diagnoses: 311 (F32.8) Other/unspec. Depressive Disorder - with anxiety  Psychosocial & Contextual Factors: family of origin stress  WHODAS 2.0 (12 item)            This questionnaire asks about difficulties due to health conditions. Health conditions  include  disease or illnesses, other health problems that may be short or long lasting,  injuries, mental health or emotional problems, and problems with alcohol or drugs.                     Think back over the past 30 days and answer these questions, thinking about how much  difficulty you had doing the following activities. For each question, please [place] only  one response.    S1 Standing for long periods such as 30 minutes? None =         1   S2 Taking care of household responsibilities? None =         1   S3 Learning a new task, for example, learning how to get to a new place? Mild =           2   S4 How much of a problem do you have joining community activities (for example, festivals, Judaism or other activities) in the same way as anyone else can? Mild =           2   S5 How much have you been emotionally affected by your health problems? Mild =           2     In the past 30 days, how much difficulty did you have in:   S6 Concentrating on doing something for ten minutes? Mild =           2   S7 Walking a long distance such as a kilometer (or equivalent)? None =         1   S8 Washing your whole body? None  =         1   S9 Getting dressed? None =         1   S10 Dealing with people you do not know? None =         1   S11 Maintaining a friendship? None =         1   S12 Your day to day work? Mild =           2     H1 Overall, in the past 30 days, how many days were these difficulties present? Record number of days 30   H2 In the past 30 days, for how many days were you totally unable to carry out your usual activities or work because of any health condition? Record number of days  0   H3 In the past 30 days, not counting the days that you were totally unable, for how many days did you cut back or reduce your usual activities or work because of any health condition? Record number of days 3     Attendance Agreement:  Client has signed Attendance Agreement:Yes      Collaboration:  Collaboration / coordination of treatment will be initiated with the following support professionals: primary care physician.      Preliminary Treatment Plan:  The client reports no currently identified Yazdanism, ethnic or cultural issues relevant to therapy.     services are not indicated.    Modifications to assist communication are not indicated.    The concerns identified by the client will be addressed in therapy.    Initial Treatment will focus on: Anxiety - reduce crying and relational problems.    As a preliminary treatment goal, client will develop more effective coping skills to manage anxiety symptoms and gain feelings awarenss and coping.    The focus of initial interventions will be to alleviate anxiety, increase coping skills, process losses, teach CBT skills, teach DBT skills, teach emotional regulation, teach mindfulness skills and teach stress mangement techniques.    Referral to another professional/service is not indicated at this time..    A Release of Information is not needed at this time.    Report to child / adult protection services was NA.    Client will have access to their [facility]'  medical record.    Cierra Hopper, LP  August 13, 2019

## 2019-08-13 NOTE — Clinical Note
Just wanted you to know that Andreina came to therapy. She has some stress that seems to come and go so her commitment may be fleeting. I will see if I can give her some useful tools in a short time.Cierra

## 2019-08-14 PROBLEM — F32.A ANXIETY AND DEPRESSION: Status: ACTIVE | Noted: 2019-08-14

## 2019-08-14 PROBLEM — F41.9 ANXIETY AND DEPRESSION: Status: ACTIVE | Noted: 2019-08-14

## 2019-08-14 ASSESSMENT — ANXIETY QUESTIONNAIRES
5. BEING SO RESTLESS THAT IT IS HARD TO SIT STILL: SEVERAL DAYS
IF YOU CHECKED OFF ANY PROBLEMS ON THIS QUESTIONNAIRE, HOW DIFFICULT HAVE THESE PROBLEMS MADE IT FOR YOU TO DO YOUR WORK, TAKE CARE OF THINGS AT HOME, OR GET ALONG WITH OTHER PEOPLE: SOMEWHAT DIFFICULT
GAD7 TOTAL SCORE: 11
2. NOT BEING ABLE TO STOP OR CONTROL WORRYING: MORE THAN HALF THE DAYS
6. BECOMING EASILY ANNOYED OR IRRITABLE: MORE THAN HALF THE DAYS
7. FEELING AFRAID AS IF SOMETHING AWFUL MIGHT HAPPEN: SEVERAL DAYS
1. FEELING NERVOUS, ANXIOUS, OR ON EDGE: SEVERAL DAYS
3. WORRYING TOO MUCH ABOUT DIFFERENT THINGS: MORE THAN HALF THE DAYS

## 2019-08-14 ASSESSMENT — COLUMBIA-SUICIDE SEVERITY RATING SCALE - C-SSRS
1. IN THE PAST MONTH, HAVE YOU WISHED YOU WERE DEAD OR WISHED YOU COULD GO TO SLEEP AND NOT WAKE UP?: NO
2. HAVE YOU ACTUALLY HAD ANY THOUGHTS OF KILLING YOURSELF?: NO
5. HAVE YOU STARTED TO WORK OUT OR WORKED OUT THE DETAILS OF HOW TO KILL YOURSELF? DO YOU INTEND TO CARRY OUT THIS PLAN?: NO
5. HAVE YOU STARTED TO WORK OUT OR WORKED OUT THE DETAILS OF HOW TO KILL YOURSELF? DO YOU INTEND TO CARRY OUT THIS PLAN?: NO
4. HAVE YOU HAD THESE THOUGHTS AND HAD SOME INTENTION OF ACTING ON THEM?: NO
1. IN THE PAST MONTH, HAVE YOU WISHED YOU WERE DEAD OR WISHED YOU COULD GO TO SLEEP AND NOT WAKE UP?: NO
4. HAVE YOU HAD THESE THOUGHTS AND HAD SOME INTENTION OF ACTING ON THEM?: NO
3. HAVE YOU BEEN THINKING ABOUT HOW YOU MIGHT KILL YOURSELF?: NO
2. HAVE YOU ACTUALLY HAD ANY THOUGHTS OF KILLING YOURSELF LIFETIME?: NO

## 2019-08-14 ASSESSMENT — PATIENT HEALTH QUESTIONNAIRE - PHQ9
SUM OF ALL RESPONSES TO PHQ QUESTIONS 1-9: 13
5. POOR APPETITE OR OVEREATING: MORE THAN HALF THE DAYS

## 2019-08-15 ASSESSMENT — ANXIETY QUESTIONNAIRES: GAD7 TOTAL SCORE: 11

## 2019-08-18 ENCOUNTER — TELEPHONE (OUTPATIENT)
Dept: OTOLARYNGOLOGY | Facility: CLINIC | Age: 59
End: 2019-08-18

## 2019-08-19 ENCOUNTER — TELEPHONE (OUTPATIENT)
Dept: PSYCHOLOGY | Facility: CLINIC | Age: 59
End: 2019-08-19

## 2019-08-19 DIAGNOSIS — F32.A ANXIETY AND DEPRESSION: Primary | ICD-10-CM

## 2019-08-19 DIAGNOSIS — F41.9 ANXIETY AND DEPRESSION: Primary | ICD-10-CM

## 2019-08-19 NOTE — TELEPHONE ENCOUNTER
Message of [facility]:    From: Andreina Duggan   Sent: 8/16/2019   7:15 AM   To: [facility] Scheduling Pool   Subject: Appointment canceled                               Appointment canceled for Andreina Duggan ([id number])   Visit Type: RETURN   Date        Time      Length    Provider                  Department   9/3/2019     3:00 PM  60 mins.  Cierra Hopper, [facility]      Reason for Cancellation: Cancelled via [facility]      Patient Comments: I don't feel this counseling is appropriate for my particular needs.  I don't think [employer] will cover it as I am not on the brink of suicide or homicide.  Thank you for your time.     This therapist called at 11:15 to discuss her concerns. She thoughts that her symptoms were not severe enough to warrant therapy, according to the questionnaire. I told her about have moderate symptoms so insurance will cover her sessions, she may have a deductible. She said she wants to pursue other self help options on her own and from what I referred but no sessions now. She will call if she needs more sessions  Thanked me for reaching out.

## 2019-08-19 NOTE — TELEPHONE ENCOUNTER
Reason for Call:  Other appointment    Detailed comments: Patient has an ENT appointment scheduled for Monday, August 19th and would like to cancel. States that the issue has cleared on it's own. Does not want to reschedule. Please cancel. Thank you.    Phone Number Patient can be reached at: Home number on file [phone] (home)    Best Time: NA    Can we leave a detailed message on this number? Not Applicable    Call taken on 8/18/2019 at 7:27 PM by Andrea Arreguin

## 2019-09-16 ENCOUNTER — OFFICE VISIT (OUTPATIENT)
Dept: OTOLARYNGOLOGY | Facility: CLINIC | Age: 59
End: 2019-09-16
Attending: NURSE PRACTITIONER
Payer: COMMERCIAL

## 2019-09-16 DIAGNOSIS — K06.9 GUM DISEASE: Primary | ICD-10-CM

## 2019-09-16 PROCEDURE — 99203 OFFICE O/P NEW LOW 30 MIN: CPT | Performed by: OTOLARYNGOLOGY

## 2019-09-16 NOTE — NURSING NOTE
Andreina Duggan's goals for this visit include:   Chief Complaint   Patient presents with     Consult     Oral lesion, noticed in May     She requests these members of her care team be copied on today's visit information: Yes    PCP: Marilyn Rose    Referring Provider:  CASSIUS Ya CNP  [id number] [place] [address]    [facility] 02/28/2014     Do you need any medication refills at today's visit? No    Becca Rick LPN

## 2019-09-16 NOTE — LETTER
2019         RE: Andreina Duggan  [address] [facility] [id number]        Dear Colleague,    Thank you for referring your patient, Andreina Duggan, to the [facility]. Please see a copy of my visit note below.    Otolaryngology Adult Consultation    Patient: Andreina Duggan  : 1960        HPI:  Andreina Dugagn is a 58 year old female seen today in the Otolaryngology Clinic for an oral lesion.  Patient has noticed a white spot on her lower gums for approximately for 5 months.  She feels like the spot is extending to other areas on her gumline as well as seeing more whiteness on her upper gums.  She denies any pain associated with these spots.  She does not smoke.  She is never used chewing tobacco.  Areas have never bled.  Nothing seems to make it better or worse.    Medications:  Current Outpatient Rx   Medication Sig Dispense Refill     ASPIRIN PO Take 325 mg by mouth as needed for moderate pain       atorvastatin (LIPITOR) 10 MG tablet Take 1 tablet (10 mg) by mouth daily 90 tablet 3     AUBAGIO 14 MG tablet 1 tablet daily       Cholecalciferol (VITAMIN D) 2000 UNITS tablet Take 1 tablet by mouth daily       conjugated estrogens (PREMARIN) 0.625 MG/GM vaginal cream Place 0.5 g vaginally twice a week 30 g 3     MULTIPLE VITAMIN PO Take by mouth daily         Allergies: Patient has no known allergies.     PMH:  Past Medical History:   Diagnosis Date     Actinic keratosis      Generalised headaches     sinus headaches     History of migraine headaches      Hyperlipidaemia      Multiple sclerosis (H)        PSH:  Past Surgical History:   Procedure Laterality Date     COLONOSCOPY N/A 3/31/2016    Procedure: COLONOSCOPY;  Surgeon: Anuel Madrigal MD;  Location: [facility] OR     LAPAROSCOPY DIAGNOSTIC (GYN)      left ovary problems, a couple of scopes       FH:  Family History   Problem Relation Age of Onset     Diabetes Father      Hypertension Father          Now      Diabetes Brother      Hyperlipidemia Mother      Anxiety Disorder Sister      Thyroid Disease Sister         Thyroid has been removed     Breast Cancer No family hx of      Cancer - colorectal No family hx of         SH:  Social History     Tobacco Use     Smoking status: Former Smoker     Packs/day: 0.50     Years: 5.00     Pack years: 2.50     Types: Cigarettes     Last attempt to quit: 2018     Years since quittin.3     Smokeless tobacco: Never Used     Tobacco comment: she isn't sure when   Substance Use Topics     Alcohol use: Yes     Alcohol/week: 4.2 oz     Drug use: No       Review of Systems  No flowsheet data found.    Physical Exam:    GEN:  The patient is alert, oriented and in no acute distress.  HEAD:  Head, face scalp is grossly normal.  ORAL:  Oral cavity shows healthy mucosa with out ulceration, masses or other lesions                involving the tongue, palate, buccal mucosa, floor of mouth or gingiva.   I closely inspected the gum of her upper and lower teeth.  Her gumline around the canine and incisors does appear to have a whitish hue to it.  This is not consistent however with leukoplakia or other oral lesions.  See no evidence of cancer in her entire oral cavity.  She does have also some thinning and whiteness of her upper gums.  NECK:   Neck is without adenopathy, thyroid or salivary gland masses.    Assessment/Plan: Discussed with the patient that I did not see any evidence of cancer or leukoplakia or other oral lesions.  This does not seem to be an inflammatory condition.  I suspect that she has thinning of the gums.  I would recommend that she speak to her dentist and have this looked at.  If it is thinning of the gums that her dentist can make recommendations on how to best manage this.  If there is any doubt from the dentist that this is not been a milligrams would recommend the patient come back and see us then you would take a biopsy.  I would prefer not to  take a biopsy at this time because I feel fairly confident that this is a benign process.  Also if this is thinning of the gums, I do not want to potentially cause more issues and would therefore prefer to have her dentist evaluated before doing a biopsy.    I spent a total of 35 minutes face-to-face with Andreina Duggan during today's office visit.  Over 50% of this time was spent counseling the patient on and/or coordinating care as documented in my assessment and plan.        Again, thank you for allowing me to participate in the care of your patient.        Sincerely,        Eugenie Barnes MD

## 2019-09-16 NOTE — PROGRESS NOTES
Otolaryngology Adult Consultation    Patient: Andreina Duggan  : 1960        HPI:  Andreina Duggan is a 58 year old female seen today in the Otolaryngology Clinic for an oral lesion.  Patient has noticed a white spot on her lower gums for approximately for 5 months.  She feels like the spot is extending to other areas on her gumline as well as seeing more whiteness on her upper gums.  She denies any pain associated with these spots.  She does not smoke.  She is never used chewing tobacco.  Areas have never bled.  Nothing seems to make it better or worse.    Medications:  Current Outpatient Rx   Medication Sig Dispense Refill     ASPIRIN PO Take 325 mg by mouth as needed for moderate pain       atorvastatin (LIPITOR) 10 MG tablet Take 1 tablet (10 mg) by mouth daily 90 tablet 3     AUBAGIO 14 MG tablet 1 tablet daily       Cholecalciferol (VITAMIN D) 2000 UNITS tablet Take 1 tablet by mouth daily       conjugated estrogens (PREMARIN) 0.625 MG/GM vaginal cream Place 0.5 g vaginally twice a week 30 g 3     MULTIPLE VITAMIN PO Take by mouth daily         Allergies: Patient has no known allergies.     PMH:  Past Medical History:   Diagnosis Date     Actinic keratosis      Generalised headaches     sinus headaches     History of migraine headaches      Hyperlipidaemia 2012     Multiple sclerosis (H)        PSH:  Past Surgical History:   Procedure Laterality Date     COLONOSCOPY N/A 3/31/2016    Procedure: COLONOSCOPY;  Surgeon: Anuel Madrigal MD;  Location: [facility]     LAPAROSCOPY DIAGNOSTIC (GYN)      left ovary problems, a couple of scopes       FH:  Family History   Problem Relation Age of Onset     Diabetes Father      Hypertension Father         Now      Diabetes Brother      Hyperlipidemia Mother      Anxiety Disorder Sister      Thyroid Disease Sister         Thyroid has been removed     Breast Cancer No family hx of      Cancer - colorectal No family hx of         SH:  Social History      Tobacco Use     Smoking status: Former Smoker     Packs/day: 0.50     Years: 5.00     Pack years: 2.50     Types: Cigarettes     Last attempt to quit: 2018     Years since quittin.3     Smokeless tobacco: Never Used     Tobacco comment: she isn't sure when   Substance Use Topics     Alcohol use: Yes     Alcohol/week: 4.2 oz     Drug use: No       Review of Systems  No flowsheet data found.    Physical Exam:    GEN:  The patient is alert, oriented and in no acute distress.  HEAD:  Head, face scalp is grossly normal.  ORAL:  Oral cavity shows healthy mucosa with out ulceration, masses or other lesions                involving the tongue, palate, buccal mucosa, floor of mouth or gingiva.   I closely inspected the gum of her upper and lower teeth.  Her gumline around the canine and incisors does appear to have a whitish hue to it.  This is not consistent however with leukoplakia or other oral lesions.  See no evidence of cancer in her entire oral cavity.  She does have also some thinning and whiteness of her upper gums.  NECK:   Neck is without adenopathy, thyroid or salivary gland masses.    Assessment/Plan: Discussed with the patient that I did not see any evidence of cancer or leukoplakia or other oral lesions.  This does not seem to be an inflammatory condition.  I suspect that she has thinning of the gums.  I would recommend that she speak to her dentist and have this looked at.  If it is thinning of the gums that her dentist can make recommendations on how to best manage this.  If there is any doubt from the dentist that this is not been a milligrams would recommend the patient come back and see us then you would take a biopsy.  I would prefer not to take a biopsy at this time because I feel fairly confident that this is a benign process.  Also if this is thinning of the gums, I do not want to potentially cause more issues and would therefore prefer to have her dentist evaluated before doing a  biopsy.    I spent a total of 35 minutes face-to-face with Andreina Duggan during today's office visit.  Over 50% of this time was spent counseling the patient on and/or coordinating care as documented in my assessment and plan.

## 2019-10-29 ENCOUNTER — HEALTH MAINTENANCE LETTER (OUTPATIENT)
Age: 59
End: 2019-10-29

## 2019-11-01 ENCOUNTER — TRANSFERRED RECORDS (OUTPATIENT)
Dept: HEALTH INFORMATION MANAGEMENT | Facility: CLINIC | Age: 59
End: 2019-11-01

## 2019-11-22 ENCOUNTER — OFFICE VISIT (OUTPATIENT)
Dept: OTOLARYNGOLOGY | Facility: CLINIC | Age: 59
End: 2019-11-22
Payer: COMMERCIAL

## 2019-11-22 VITALS — SYSTOLIC BLOOD PRESSURE: 156 MMHG | OXYGEN SATURATION: 99 % | DIASTOLIC BLOOD PRESSURE: 84 MMHG | HEART RATE: 73 BPM

## 2019-11-22 DIAGNOSIS — K13.29 HYPERKERATOTIC ORAL LESION: Primary | ICD-10-CM

## 2019-11-22 PROCEDURE — 99213 OFFICE O/P EST LOW 20 MIN: CPT | Performed by: OTOLARYNGOLOGY

## 2019-11-22 ASSESSMENT — ENCOUNTER SYMPTOMS
STRIDOR: 0
TREMORS: 0
CONSTITUTIONAL NEGATIVE: 1
HEMOPTYSIS: 0
TINGLING: 0
DIZZINESS: 0
HEADACHES: 0
PHOTOPHOBIA: 0
COUGH: 0
HEARTBURN: 0
VOMITING: 0
SORE THROAT: 0
BRUISES/BLEEDS EASILY: 0
SPUTUM PRODUCTION: 0
BLURRED VISION: 0
NAUSEA: 0
SINUS PAIN: 0
DOUBLE VISION: 0

## 2019-11-22 ASSESSMENT — PAIN SCALES - GENERAL: PAINLEVEL: NO PAIN (0)

## 2019-11-22 NOTE — LETTER
11/22/2019         RE: Andreina Duggan  [address] [facility] [id number]        Dear Colleague,    Thank you for referring your patient, Andreina Duggan, to the [facility]. Please see a copy of my visit note below.    HPI    This is a 59 year old patient who has noticed a white spot in her lower gum around the canine tooth area for 6 months ago. Describes that it is bigger and extended to around. Denies any pain, difficulty swallowing, voice changes, difficulty chewing. No hx of long term antibiotics, chemotherapy or radiation. No known allergies. She has a hx of multiple sclerosis, anxiety and depression. She is on Teriflunomide for MS.    Review of Systems   Constitutional: Negative.    HENT: Negative for congestion, ear discharge, ear pain, hearing loss, nosebleeds, sinus pain, sore throat and tinnitus.    Eyes: Negative for blurred vision, double vision and photophobia.   Respiratory: Negative for cough, hemoptysis, sputum production and stridor.    Gastrointestinal: Negative for heartburn, nausea and vomiting.   Skin: Negative.    Neurological: Negative for dizziness, tingling, tremors and headaches.   Endo/Heme/Allergies: Negative for environmental allergies. Does not bruise/bleed easily.         Physical Exam  Vitals signs reviewed.   Constitutional:       Appearance: She is normal weight.   HENT:      Head: Normocephalic and atraumatic.      Right Ear: Hearing, tympanic membrane, ear canal and external ear normal. No decreased hearing noted. No laceration or drainage. There is no impacted cerumen.      Left Ear: Hearing, tympanic membrane, ear canal and external ear normal. No decreased hearing noted. No laceration or drainage.      Nose: Nose normal. No nasal deformity, congestion or rhinorrhea.      Right Turbinates: Not enlarged or swollen.      Left Turbinates: Not enlarged or swollen.      Mouth/Throat:      Mouth: Mucous membranes are moist.      Palate: No mass and  lesions.      Pharynx: Oropharynx is clear. Uvula midline.   Eyes:      Extraocular Movements: Extraocular movements intact.      Pupils: Pupils are equal, round, and reactive to light.   Neurological:      Mental Status: She is alert.         Left lower gum around canine tooth white hyperkeratotic white appearance on an atrophied gingival tissue. Not sensitive to palpation.     A/P  The oral lesion in her mouth does look like lichen planus. No fungal infection or leukoplakia. Because of gingival atrophy, I am not in the favor of biopsying the lesion. It is likely that this might be due to teriflunomide effect to the mucosa. I will check her CBC, basic metabolic panel and give her a call. F/u in 2 months or as needed. Her questions were answered.    Again, thank you for allowing me to participate in the care of your patient.        Sincerely,        James Redmond MD

## 2019-11-22 NOTE — PROGRESS NOTES
HPI    This is a 59 year old patient who has noticed a white spot in her lower gum around the canine tooth area for 6 months ago. Describes that it is bigger and extended to around. Denies any pain, difficulty swallowing, voice changes, difficulty chewing. No hx of long term antibiotics, chemotherapy or radiation. No known allergies. She has a hx of multiple sclerosis, anxiety and depression. She is on Teriflunomide for MS.    Review of Systems   Constitutional: Negative.    HENT: Negative for congestion, ear discharge, ear pain, hearing loss, nosebleeds, sinus pain, sore throat and tinnitus.    Eyes: Negative for blurred vision, double vision and photophobia.   Respiratory: Negative for cough, hemoptysis, sputum production and stridor.    Gastrointestinal: Negative for heartburn, nausea and vomiting.   Skin: Negative.    Neurological: Negative for dizziness, tingling, tremors and headaches.   Endo/Heme/Allergies: Negative for environmental allergies. Does not bruise/bleed easily.         Physical Exam  Vitals signs reviewed.   Constitutional:       Appearance: She is normal weight.   HENT:      Head: Normocephalic and atraumatic.      Right Ear: Hearing, tympanic membrane, ear canal and external ear normal. No decreased hearing noted. No laceration or drainage. There is no impacted cerumen.      Left Ear: Hearing, tympanic membrane, ear canal and external ear normal. No decreased hearing noted. No laceration or drainage.      Nose: Nose normal. No nasal deformity, congestion or rhinorrhea.      Right Turbinates: Not enlarged or swollen.      Left Turbinates: Not enlarged or swollen.      Mouth/Throat:      Mouth: Mucous membranes are moist.      Palate: No mass and lesions.      Pharynx: Oropharynx is clear. Uvula midline.   Eyes:      Extraocular Movements: Extraocular movements intact.      Pupils: Pupils are equal, round, and reactive to light.   Neurological:      Mental Status: She is alert.         Left lower  gum around canine tooth white hyperkeratotic white appearance on an atrophied gingival tissue. Not sensitive to palpation.     A/P  The oral lesion in her mouth does look like lichen planus. No fungal infection or leukoplakia. Because of gingival atrophy, I am not in the favor of biopsying the lesion. It is likely that this might be due to teriflunomide effect to the mucosa. I will check her CBC, basic metabolic panel and give her a call. F/u in 2 months or as needed. Her questions were answered.

## 2019-11-22 NOTE — NURSING NOTE
Andreina Duggan's goals for this visit include:   Chief Complaint   Patient presents with     RECHECK     White spots on gums     She requests these members of her care team be copied on today's visit information: Yes    PCP: Marilyn Rose    Referring Provider:  No referring provider defined for this encounter.    BP (!) 156/84 (BP Location: Right arm, Patient Position: Sitting, Cuff Size: Adult Regular)   Pulse 73   LMP 02/28/2014   SpO2 99%     Do you need any medication refills at today's visit? No    Becca Rick LPN

## 2019-11-29 DIAGNOSIS — Z51.81 ENCOUNTER FOR THERAPEUTIC DRUG MONITORING: ICD-10-CM

## 2019-11-29 DIAGNOSIS — K13.29 HYPERKERATOTIC ORAL LESION: ICD-10-CM

## 2019-11-29 LAB
ALT SERPL W P-5'-P-CCNC: 32 U/L (ref 0–50)
ANION GAP SERPL CALCULATED.3IONS-SCNC: 2 MMOL/L (ref 3–14)
AST SERPL W P-5'-P-CCNC: 22 U/L (ref 0–45)
BASOPHILS # BLD AUTO: 0 10E9/L (ref 0–0.2)
BASOPHILS NFR BLD AUTO: 0.5 %
BILIRUB SERPL-MCNC: 0.6 MG/DL (ref 0.2–1.3)
BUN SERPL-MCNC: 26 MG/DL (ref 7–30)
CALCIUM SERPL-MCNC: 10.1 MG/DL (ref 8.5–10.1)
CHLORIDE SERPL-SCNC: 111 MMOL/L (ref 94–109)
CO2 SERPL-SCNC: 29 MMOL/L (ref 20–32)
CREAT SERPL-MCNC: 0.86 MG/DL (ref 0.52–1.04)
DIFFERENTIAL METHOD BLD: NORMAL
EOSINOPHIL # BLD AUTO: 0.2 10E9/L (ref 0–0.7)
EOSINOPHIL NFR BLD AUTO: 4.1 %
ERYTHROCYTE [DISTWIDTH] IN BLOOD BY AUTOMATED COUNT: 13.1 % (ref 10–15)
GFR SERPL CREATININE-BSD FRML MDRD: 75 ML/MIN/{1.73_M2}
GLUCOSE SERPL-MCNC: 88 MG/DL (ref 70–99)
HCT VFR BLD AUTO: 40.2 % (ref 35–47)
HGB BLD-MCNC: 12.7 G/DL (ref 11.7–15.7)
LYMPHOCYTES # BLD AUTO: 1.4 10E9/L (ref 0.8–5.3)
LYMPHOCYTES NFR BLD AUTO: 33.2 %
MCH RBC QN AUTO: 28.9 PG (ref 26.5–33)
MCHC RBC AUTO-ENTMCNC: 31.6 G/DL (ref 31.5–36.5)
MCV RBC AUTO: 91 FL (ref 78–100)
MONOCYTES # BLD AUTO: 0.6 10E9/L (ref 0–1.3)
MONOCYTES NFR BLD AUTO: 13.9 %
NEUTROPHILS # BLD AUTO: 2 10E9/L (ref 1.6–8.3)
NEUTROPHILS NFR BLD AUTO: 48.3 %
PLATELET # BLD AUTO: 176 10E9/L (ref 150–450)
POTASSIUM SERPL-SCNC: 4.2 MMOL/L (ref 3.4–5.3)
RBC # BLD AUTO: 4.4 10E12/L (ref 3.8–5.2)
SODIUM SERPL-SCNC: 142 MMOL/L (ref 133–144)
WBC # BLD AUTO: 4.2 10E9/L (ref 4–11)

## 2019-11-29 PROCEDURE — 84450 TRANSFERASE (AST) (SGOT): CPT | Performed by: OTOLARYNGOLOGY

## 2019-11-29 PROCEDURE — 82247 BILIRUBIN TOTAL: CPT | Performed by: OTOLARYNGOLOGY

## 2019-11-29 PROCEDURE — 85025 COMPLETE CBC W/AUTO DIFF WBC: CPT | Performed by: OTOLARYNGOLOGY

## 2019-11-29 PROCEDURE — 80048 BASIC METABOLIC PNL TOTAL CA: CPT | Performed by: OTOLARYNGOLOGY

## 2019-11-29 PROCEDURE — 84460 ALANINE AMINO (ALT) (SGPT): CPT | Performed by: OTOLARYNGOLOGY

## 2019-11-29 PROCEDURE — 36415 COLL VENOUS BLD VENIPUNCTURE: CPT | Performed by: OTOLARYNGOLOGY

## 2019-12-04 ENCOUNTER — TELEPHONE (OUTPATIENT)
Dept: OTOLARYNGOLOGY | Facility: CLINIC | Age: 59
End: 2019-12-04

## 2019-12-04 NOTE — TELEPHONE ENCOUNTER
----- Message from James Redmond MD sent at 11/29/2019  2:20 PM CST -----  Her oral lesion is likely due to teriflunomide effect to the mucosa. Her anion gap is slightly low; that might be a lab error or due to low protein. I will refer her to her primary physician for that. I will see her in the f/u.

## 2019-12-04 NOTE — TELEPHONE ENCOUNTER
Phone call to pt with lab results as reviewed by Dr Redmond.  Recommended pt call back if she has any further concerns. Pt verbalized understanding of plan.  Roxy Boss RN

## 2020-01-16 ENCOUNTER — TRANSFERRED RECORDS (OUTPATIENT)
Dept: HEALTH INFORMATION MANAGEMENT | Facility: CLINIC | Age: 60
End: 2020-01-16

## 2020-01-27 DIAGNOSIS — E78.5 HYPERLIPIDEMIA WITH TARGET LDL LESS THAN 160: ICD-10-CM

## 2020-01-27 RX ORDER — ATORVASTATIN CALCIUM 10 MG/1
TABLET, FILM COATED ORAL
Qty: 30 TABLET | Refills: 0 | Status: SHIPPED | OUTPATIENT
Start: 2020-01-27 | End: 2020-02-07

## 2020-01-27 NOTE — LETTER
January 27, 2020      Andreina Urban  [address] [place] [id number]      Dear Andreina,    We recently received a call from your pharmacy requesting a refill of your medication.    A review of your chart indicates that an appointment is required with your provider.  Please call the clinic to schedule your appointment.    We have authorized one refill of your medication to allow time for you to schedule.   If you have a history of diabetes or high cholesterol, please come in fasting for the appointment. Fasting entails nothing to eat or drink 8 hours prior to your appointment; with the exception on water. You may take your medication the day of the appointment.    Thank you,      Marilyn Rose CNP

## 2020-01-27 NOTE — TELEPHONE ENCOUNTER
LOV- 12/28/18. Due for a physical with fasting labs, sent letter and cornelius x1.  Ingrid Garcia RN

## 2020-02-04 ENCOUNTER — DOCUMENTATION ONLY (OUTPATIENT)
Dept: LAB | Facility: CLINIC | Age: 60
End: 2020-02-04

## 2020-02-04 DIAGNOSIS — Z00.00 ROUTINE GENERAL MEDICAL EXAMINATION AT A HEALTH CARE FACILITY: Primary | ICD-10-CM

## 2020-02-04 DIAGNOSIS — G35 MS (MULTIPLE SCLEROSIS) (H): ICD-10-CM

## 2020-02-04 DIAGNOSIS — Z13.29 SCREENING FOR THYROID DISORDER: ICD-10-CM

## 2020-02-04 DIAGNOSIS — Z13.1 SCREENING FOR DIABETES MELLITUS (DM): ICD-10-CM

## 2020-02-04 DIAGNOSIS — E78.5 HYPERLIPIDEMIA WITH TARGET LDL LESS THAN 160: ICD-10-CM

## 2020-02-05 NOTE — PROGRESS NOTES
Next 5 appointments (look out 90 days)    Feb 07, 2020 10:10 AM CST  PHYSICAL with CASSIUS Ya CNP  [facility] [id number] [place] [id number]  [phone]

## 2020-02-07 ENCOUNTER — OFFICE VISIT (OUTPATIENT)
Dept: PEDIATRICS | Facility: CLINIC | Age: 60
End: 2020-02-07
Payer: COMMERCIAL

## 2020-02-07 VITALS
SYSTOLIC BLOOD PRESSURE: 100 MMHG | DIASTOLIC BLOOD PRESSURE: 70 MMHG | OXYGEN SATURATION: 96 % | HEART RATE: 66 BPM | HEIGHT: 64 IN | TEMPERATURE: 98.4 F | BODY MASS INDEX: 23.29 KG/M2 | WEIGHT: 136.4 LBS

## 2020-02-07 DIAGNOSIS — Z00.00 ROUTINE GENERAL MEDICAL EXAMINATION AT A HEALTH CARE FACILITY: ICD-10-CM

## 2020-02-07 DIAGNOSIS — Z13.1 SCREENING FOR DIABETES MELLITUS (DM): ICD-10-CM

## 2020-02-07 DIAGNOSIS — Z00.00 ROUTINE GENERAL MEDICAL EXAMINATION AT A HEALTH CARE FACILITY: Primary | ICD-10-CM

## 2020-02-07 DIAGNOSIS — Z13.29 SCREENING FOR THYROID DISORDER: ICD-10-CM

## 2020-02-07 DIAGNOSIS — E78.5 HYPERLIPIDEMIA WITH TARGET LDL LESS THAN 160: ICD-10-CM

## 2020-02-07 DIAGNOSIS — R10.2 PELVIC CRAMPING: ICD-10-CM

## 2020-02-07 LAB
ALBUMIN SERPL-MCNC: 3.8 G/DL (ref 3.4–5)
ALP SERPL-CCNC: 62 U/L (ref 40–150)
ALT SERPL W P-5'-P-CCNC: 24 U/L (ref 0–50)
ANION GAP SERPL CALCULATED.3IONS-SCNC: 1 MMOL/L (ref 3–14)
AST SERPL W P-5'-P-CCNC: 12 U/L (ref 0–45)
BILIRUB SERPL-MCNC: 0.6 MG/DL (ref 0.2–1.3)
BUN SERPL-MCNC: 16 MG/DL (ref 7–30)
CALCIUM SERPL-MCNC: 10.3 MG/DL (ref 8.5–10.1)
CHLORIDE SERPL-SCNC: 112 MMOL/L (ref 94–109)
CHOLEST SERPL-MCNC: 215 MG/DL
CO2 SERPL-SCNC: 28 MMOL/L (ref 20–32)
CREAT SERPL-MCNC: 0.78 MG/DL (ref 0.52–1.04)
GFR SERPL CREATININE-BSD FRML MDRD: 84 ML/MIN/{1.73_M2}
GLUCOSE SERPL-MCNC: 98 MG/DL (ref 70–99)
HDLC SERPL-MCNC: 64 MG/DL
LDLC SERPL CALC-MCNC: 118 MG/DL
NONHDLC SERPL-MCNC: 151 MG/DL
POTASSIUM SERPL-SCNC: 4.4 MMOL/L (ref 3.4–5.3)
PROT SERPL-MCNC: 7.1 G/DL (ref 6.8–8.8)
SODIUM SERPL-SCNC: 141 MMOL/L (ref 133–144)
TRIGL SERPL-MCNC: 166 MG/DL
TSH SERPL DL<=0.005 MIU/L-ACNC: 1.34 MU/L (ref 0.4–4)

## 2020-02-07 PROCEDURE — 99396 PREV VISIT EST AGE 40-64: CPT | Performed by: NURSE PRACTITIONER

## 2020-02-07 PROCEDURE — 80053 COMPREHEN METABOLIC PANEL: CPT | Performed by: NURSE PRACTITIONER

## 2020-02-07 PROCEDURE — 84443 ASSAY THYROID STIM HORMONE: CPT | Performed by: NURSE PRACTITIONER

## 2020-02-07 PROCEDURE — 36415 COLL VENOUS BLD VENIPUNCTURE: CPT | Performed by: NURSE PRACTITIONER

## 2020-02-07 PROCEDURE — 80061 LIPID PANEL: CPT | Performed by: NURSE PRACTITIONER

## 2020-02-07 RX ORDER — ATORVASTATIN CALCIUM 10 MG/1
10 TABLET, FILM COATED ORAL DAILY
Qty: 90 TABLET | Refills: 3 | Status: SHIPPED | OUTPATIENT
Start: 2020-02-07 | End: 2021-02-25

## 2020-02-07 ASSESSMENT — ANXIETY QUESTIONNAIRES
IF YOU CHECKED OFF ANY PROBLEMS ON THIS QUESTIONNAIRE, HOW DIFFICULT HAVE THESE PROBLEMS MADE IT FOR YOU TO DO YOUR WORK, TAKE CARE OF THINGS AT HOME, OR GET ALONG WITH OTHER PEOPLE: SOMEWHAT DIFFICULT
2. NOT BEING ABLE TO STOP OR CONTROL WORRYING: SEVERAL DAYS
GAD7 TOTAL SCORE: 4
6. BECOMING EASILY ANNOYED OR IRRITABLE: NOT AT ALL
1. FEELING NERVOUS, ANXIOUS, OR ON EDGE: SEVERAL DAYS
7. FEELING AFRAID AS IF SOMETHING AWFUL MIGHT HAPPEN: SEVERAL DAYS
3. WORRYING TOO MUCH ABOUT DIFFERENT THINGS: SEVERAL DAYS
5. BEING SO RESTLESS THAT IT IS HARD TO SIT STILL: NOT AT ALL

## 2020-02-07 ASSESSMENT — PATIENT HEALTH QUESTIONNAIRE - PHQ9
SUM OF ALL RESPONSES TO PHQ QUESTIONS 1-9: 1
5. POOR APPETITE OR OVEREATING: NOT AT ALL

## 2020-02-07 ASSESSMENT — MIFFLIN-ST. JEOR: SCORE: 1192.68

## 2020-02-07 NOTE — PATIENT INSTRUCTIONS
PLAN:   1.   Symptomatic therapy suggested: Continue current medication regimen unchanged.  Increase calcium to 1000mg and 800iu Vit D  2.  Orders Placed This Encounter   Medications     atorvastatin (LIPITOR) 10 MG tablet     Sig: Take 1 tablet (10 mg) by mouth daily     Dispense:  90 tablet     Refill:  3     Orders Placed This Encounter   Procedures     US Pelvic Complete w Transvaginal       3. Patient needs to follow up in if no improvement,or sooner if worsening of symptoms or other symptoms develop.  FURTHER TESTING:       - pelvic ultrasound  I will place order. Please call [phone] to schedule.  Will follow up and/or notify patient of  results via My Chart to determine further need for followup  Follow up office visit in one year for annual health maintenance exam, sooner PRN.    Preventive Health Recommendations  Female Ages 50 - 64    Yearly exam: See your health care provider every year in order to  o Review health changes.   o Discuss preventive care.    o Review your medicines if your doctor has prescribed any.      Get a Pap test every three years (unless you have an abnormal result and your provider advises testing more often).    If you get Pap tests with HPV test, you only need to test every 5 years, unless you have an abnormal result.     You do not need a Pap test if your uterus was removed (hysterectomy) and you have not had cancer.    You should be tested each year for STDs (sexually transmitted diseases) if you're at risk.     Have a mammogram every 1 to 2 years.    Have a colonoscopy at age 50, or have a yearly FIT test (stool test). These exams screen for colon cancer.      Have a cholesterol test every 5 years, or more often if advised.    Have a diabetes test (fasting glucose) every three years. If you are at risk for diabetes, you should have this test more often.     If you are at risk for osteoporosis (brittle bone disease), think about having a bone density scan (DEXA).    Shots:  Get a flu shot each year. Get a tetanus shot every 10 years.    Nutrition:     Eat at least 5 servings of fruits and vegetables each day.    Eat whole-grain bread, whole-wheat pasta and brown rice instead of white grains and rice.    Get adequate Calcium and Vitamin D.     Lifestyle    Exercise at least 150 minutes a week (30 minutes a day, 5 days a week). This will help you control your weight and prevent disease.    Limit alcohol to one drink per day.    No smoking.     Wear sunscreen to prevent skin cancer.     See your dentist every six months for an exam and cleaning.    See your eye doctor every 1 to 2 years.

## 2020-02-07 NOTE — NURSING NOTE
"Chief Complaint   Patient presents with     Physical     AFE       Initial /70 (BP Location: Right arm, Patient Position: Sitting, Cuff Size: Adult Regular)   Pulse 66   Temp 98.4  F (36.9  C) (Oral)   Ht 1.632 m (5' 4.25\")   Wt 61.9 kg (136 lb 6.4 oz)   LMP 02/28/2014   SpO2 96%   BMI 23.23 kg/m   Estimated body mass index is 23.23 kg/m  as calculated from the following:    Height as of this encounter: 1.632 m (5' 4.25\").    Weight as of this encounter: 61.9 kg (136 lb 6.4 oz).  Medication Reconciliation: complete      LAMAR Mathew      "

## 2020-02-07 NOTE — PROGRESS NOTES
SUBJECTIVE:   CC: Andreina Duggan is an 59 year old woman who presents for preventive health visit.     Healthy Habits:     Getting at least 3 servings of Calcium per day:  NO    Bi-annual eye exam:  NO    Dental care twice a year:  Yes    Sleep apnea or symptoms of sleep apnea:  None    Diet:  Regular (no restrictions)    Frequency of exercise:  6-7 days/week    Duration of exercise:  15-30 minutes    Taking medications regularly:  Yes    Medication side effects:  Not applicable    PHQ-2 Total Score: 0    Additional concerns today:  No          Hyperlipidemia Follow-Up      Are you regularly taking any medication or supplement to lower your cholesterol?   Yes- daily    Are you having muscle aches or other side effects that you think could be caused by your cholesterol lowering medication?  Yes- muscle aches in bilateral legs      Today's PHQ-2 Score:   PHQ-2 (  [employer]) 2020   Q1: Little interest or pleasure in doing things 0   Q2: Feeling down, depressed or hopeless 0   PHQ-2 Score 0   Q1: Little interest or pleasure in doing things -   Q2: Feeling down, depressed or hopeless -   PHQ-2 Score -       Abuse: Current or Past(Physical, Sexual or Emotional)- No  Do you feel safe in your environment? Yes    Have you ever done Advance Care Planning? (For example, a Health Directive, POLST, or a discussion with a medical provider or your loved ones about your wishes): No, advance care planning information given to patient to review.  Patient plans to discuss their wishes with loved ones or provider.      Social History     Tobacco Use     Smoking status: Former Smoker     Packs/day: 0.50     Years: 5.00     Pack years: 2.50     Types: Cigarettes     Last attempt to quit: 2018     Years since quittin.8     Smokeless tobacco: Never Used     Tobacco comment: she isn't sure when   Substance Use Topics     Alcohol use: Yes     Alcohol/week: 7.0 standard drinks     If you drink alcohol do you typically have  >3 drinks per day or >7 drinks per week? No      Reviewed orders with patient.  Reviewed health maintenance and updated orders accordingly - Yes  Lab work is in process  Labs reviewed in EPIC  BP Readings from Last 3 Encounters:   20 100/70   19 (!) 156/84   18 129/70    Wt Readings from Last 3 Encounters:   20 61.9 kg (136 lb 6.4 oz)   18 60.1 kg (132 lb 6.4 oz)   17 57.2 kg (126 lb)                  Patient Active Problem List   Diagnosis     MS (multiple sclerosis) (H)     Hyperlipidemia with target LDL less than 160     Anxiety and depression     Past Surgical History:   Procedure Laterality Date     COLONOSCOPY N/A 3/31/2016    Procedure: COLONOSCOPY;  Surgeon: Anuel Madrigal MD;  Location: [facility] OR     LAPAROSCOPY DIAGNOSTIC (GYN)  1980s    left ovary problems, a couple of scopes       Social History     Tobacco Use     Smoking status: Former Smoker     Packs/day: 0.50     Years: 5.00     Pack years: 2.50     Types: Cigarettes     Last attempt to quit: 2018     Years since quittin.7     Smokeless tobacco: Never Used     Tobacco comment: she isn't sure when   Substance Use Topics     Alcohol use: Yes     Alcohol/week: 7.0 standard drinks     Family History   Problem Relation Age of Onset     Diabetes Father      Hypertension Father         Now      Diabetes Brother      Hyperlipidemia Mother      Anxiety Disorder Sister      Thyroid Disease Sister         Thyroid has been removed     Breast Cancer No family hx of      Cancer - colorectal No family hx of          Current Outpatient Medications   Medication Sig Dispense Refill     ASPIRIN PO Take 325 mg by mouth as needed for moderate pain       atorvastatin (LIPITOR) 10 MG tablet TAKE 1 TABLET(10 MG) BY MOUTH DAILY 30 tablet 0     Cholecalciferol (VITAMIN D) 2000 UNITS tablet Take 1 tablet by mouth daily       MULTIPLE VITAMIN PO Take by mouth daily       AUBAGIO 14 MG tablet 1 tablet daily       No Known  Allergies    Mammogram Screening: Patient over age 50, mutual decision to screen reflected in health maintenance.    Pertinent mammograms are reviewed under the imaging tab.  History of abnormal Pap smear: NO - age 30-65 PAP every 5 years with negative HPV co-testing recommended  PAP / HPV Latest Ref Rng & Units 12/28/2018   PAP - NIL   HPV 16 DNA NEG:Negative Negative   HPV 18 DNA NEG:Negative Negative   OTHER HR HPV NEG:Negative Negative     Reviewed and updated as needed this visit by clinical staff  Tobacco  Allergies  Meds  Problems  Med Hx  Surg Hx  Fam Hx  Soc Hx          Reviewed and updated as needed this visit by Provider  Problems        Past Medical History:   Diagnosis Date     Actinic keratosis      Generalised headaches     sinus headaches     History of migraine headaches      Hyperlipidaemia 2012     Multiple sclerosis (H)       Past Surgical History:   Procedure Laterality Date     COLONOSCOPY N/A 3/31/2016    Procedure: COLONOSCOPY;  Surgeon: Anuel Madrigal MD;  Location: [facility]     LAPAROSCOPY DIAGNOSTIC (GYN)  1980s    left ovary problems, a couple of scopes       Review of Systems  CONSTITUTIONAL:NEGATIVE for fever, chills, change in weight  INTEGUMENTARY/SKIN: NEGATIVE for worrisome rashes, moles or lesions  EYES: NEGATIVE for vision changes or irritation and sees eye MD   ENT: NEGATIVE for ear, mouth and throat problems  RESP:NEGATIVE for significant cough or SOB  BREAST: NEGATIVE for masses, tenderness or discharge  CV: NEGATIVE for chest pain, palpitations or peripheral edema  GI: POSITIVE for heartburn or reflux and uses some TUMS. and NEGATIVE for abdominal pain , jaundice, melena, nausea, poor appetite, vomiting and weight loss   menopausal female: amenorrhea, no unusual urinary symptoms, no unusual vaginal symptoms and vaginal dryness. Did notice some pelvic cramping about a week ago and felt almost like having a period   MUSCULOSKELETAL:NEGATIVE for significant  "arthralgias or myalgia and POSITIVE  for hx MS   NEURO: NEGATIVE for dizziness/lightheadedness, HX CVA, HX TIA, HX seizure D/O, involuntary movements and gait disturbance  ENDOCRINE: NEGATIVE for temperature intolerance, skin/hair changes  HEME/ALLERGY/IMMUNE: NEGATIVE for bleeding problems  PSYCHIATRIC: NEGATIVE for changes in mood or affect      OBJECTIVE:   /70 (BP Location: Right arm, Patient Position: Sitting, Cuff Size: Adult Regular)   Pulse 66   Temp 98.4  F (36.9  C) (Oral)   Ht 1.632 m (5' 4.25\")   Wt 61.9 kg (136 lb 6.4 oz)   LMP 02/28/2014   SpO2 96%   BMI 23.23 kg/m    Physical Exam  GENERAL APPEARANCE: healthy, alert and no distress  EYES: Eyes grossly normal to inspection and conjunctivae and sclerae normal  HENT: ear canals and TM's normal, nose and mouth without ulcers or lesions, oropharynx clear and oral mucous membranes moist  NECK: no adenopathy, no asymmetry, masses, or scars and thyroid normal to palpation  RESP: lungs clear to auscultation - no rales, rhonchi or wheezes  BREAST: normal without masses, tenderness or nipple discharge and no palpable axillary masses or adenopathy  CV: regular rates and rhythm, no murmur, click or rub, no peripheral edema and peripheral pulses strong  ABDOMEN: soft, nontender, no hepatosplenomegaly, no masses and bowel sounds normal   (female): normal female external genitalia, normal urethral meatus, vaginal mucosal atrophy noted, normal cervix, adnexae, and uterus without masses or abnormal discharge  MS: no musculoskeletal defects are noted and gait is age appropriate without ataxia  SKIN: no suspicious lesions or rashes  NEURO: Normal strength and tone, sensory exam grossly normal, mentation intact and speech normal  PSYCH: mentation appears normal and affect normal/bright    Diagnostic Test Results:  Labs reviewed in Epic  Results for orders placed or performed in visit on 02/07/20   Lipid panel reflex to direct LDL Fasting     Status: " Abnormal   Result Value Ref Range    Cholesterol 215 (H) <200 mg/dL    Triglycerides 166 (H) <150 mg/dL    HDL Cholesterol 64 >49 mg/dL    LDL Cholesterol Calculated 118 (H) <100 mg/dL    Non HDL Cholesterol 151 (H) <130 mg/dL   TSH with free T4 reflex     Status: None   Result Value Ref Range    TSH 1.34 0.40 - 4.00 mU/L   Comprehensive metabolic panel     Status: Abnormal   Result Value Ref Range    Sodium 141 133 - 144 mmol/L    Potassium 4.4 3.4 - 5.3 mmol/L    Chloride 112 (H) 94 - 109 mmol/L    Carbon Dioxide 28 20 - 32 mmol/L    Anion Gap 1 (L) 3 - 14 mmol/L    Glucose 98 70 - 99 mg/dL    Urea Nitrogen 16 7 - 30 mg/dL    Creatinine 0.78 0.52 - 1.04 mg/dL    GFR Estimate 84 >60 mL/min/[1.73_m2]    GFR Estimate If Black >90 >60 mL/min/[1.73_m2]    Calcium 10.3 (H) 8.5 - 10.1 mg/dL    Bilirubin Total 0.6 0.2 - 1.3 mg/dL    Albumin 3.8 3.4 - 5.0 g/dL    Protein Total 7.1 6.8 - 8.8 g/dL    Alkaline Phosphatase 62 40 - 150 U/L    ALT 24 0 - 50 U/L    AST 12 0 - 45 U/L       ASSESSMENT/PLAN:   Andreina was seen today for physical.    Diagnoses and all orders for this visit:    Routine general medical examination at a health care facility    Hyperlipidemia with target LDL less than 160  -     atorvastatin (LIPITOR) 10 MG tablet; Take 1 tablet (10 mg) by mouth daily    Pelvic cramping  -     US Pelvic Complete w Transvaginal; Future  Will follow up and/or notify patient of  results via My Chart to determine further need for followup      PLAN:   Continue current medication regimen unchanged.   Patient needs to follow up in if no improvement,or sooner if worsening of symptoms or other symptoms develop.  FURTHER TESTING:       - pelvic ultrasound  I will place order. Please call [phone] to schedule.  Will follow up and/or notify patient of  results via My Chart to determine further need for followup  Follow up office visit in one year for annual health maintenance exam, sooner PRN.    COUNSELING:  Reviewed preventive  "health counseling, as reflected in patient instructions  Special attention given to:        Regular exercise       Healthy diet/nutrition       Vision screening       Hearing screening       Osteoporosis Prevention/Bone Health       Colon cancer screening       Consider Hep C screening for patients born between 1945 and 1965       The 10-year ASCVD risk score (Janay ABERNATHY Jr., et al., 2013) is: 1.4%    Values used to calculate the score:      Age: 59 years      Sex: Female      Is Non- : No      Diabetic: No      Tobacco smoker: No      Systolic Blood Pressure: 100 mmHg      Is BP treated: No      HDL Cholesterol: 64 mg/dL      Total Cholesterol: 215 mg/dL    Estimated body mass index is 23.23 kg/m  as calculated from the following:    Height as of this encounter: 1.632 m (5' 4.25\").    Weight as of this encounter: 61.9 kg (136 lb 6.4 oz).         reports that she quit smoking about 21 months ago. Her smoking use included cigarettes. She has a 2.50 pack-year smoking history. She has never used smokeless tobacco.      Counseling Resources:  ATP IV Guidelines  Pooled Cohorts Equation Calculator  Breast Cancer Risk Calculator  FRAX Risk Assessment  ICSI Preventive Guidelines  Dietary Guidelines for Americans, 2010  USDA's MyPlate  ASA Prophylaxis  Lung CA Screening    Marilyn Rose, CASSIUS CNP  [facility]  "

## 2020-02-08 ASSESSMENT — ANXIETY QUESTIONNAIRES: GAD7 TOTAL SCORE: 4

## 2020-02-08 NOTE — RESULT ENCOUNTER NOTE
Grace Duggan,    Attached are your test results.  -Cholesterol levels are at your goal levels.  ADVISE: continuing your medication, a regular exercise program with at least 150 minutes of aerobic exercise per week, and eating a low saturated fat/low carbohydrate diet.  Also, you should recheck this fasting cholesterol panel in 12 months.  -Liver and gallbladder tests (ALT,AST, Alk phos,bilirubin) are normal.  -Kidney function (GFR) is normal.  -Sodium is normal.  -Potassium is normal.  -Glucose (diabetic screening test) is normal.  -TSH (thyroid stimulating hormone) level is normal which indicates normal thyroid function.   Please contact us if you have any questions.    Marilyn Rose, CNP

## 2020-06-03 ENCOUNTER — TELEPHONE (OUTPATIENT)
Dept: PEDIATRICS | Facility: CLINIC | Age: 60
End: 2020-06-03

## 2020-06-03 ENCOUNTER — MYC MEDICAL ADVICE (OUTPATIENT)
Dept: PEDIATRICS | Facility: CLINIC | Age: 60
End: 2020-06-03

## 2020-06-03 NOTE — TELEPHONE ENCOUNTER
Routing to provider for visit preference.    Babita Morelos, RN, BSN, PHN  [facility]

## 2020-06-03 NOTE — TELEPHONE ENCOUNTER
"Patient sent a mychart appointment request:     \"I am having a cold sore outbreak that seems to have spread to my sinuses and eyes.  Eyes very itchy (started approx. 2 weeks ago), blisters inside nose and one small blister near corner of right eye.\"    Writer will let the patient know that the care team will be reaching out to her to see what appointment would be appropriate for her.     Please advise.  "

## 2020-06-04 NOTE — TELEPHONE ENCOUNTER
Patient will contact clinic for appt if needed.   See [employer] message.     Kim Ortiz MA

## 2020-06-04 NOTE — TELEPHONE ENCOUNTER
Pt wanted to let clinic know that she decided not to schedule an appt at this time. Please call pt with any questions.

## 2020-06-04 NOTE — TELEPHONE ENCOUNTER
Writer attached note in mychart note vs this encounter. Pt decided not to schedule appt at this time. Please call pt with any questions.

## 2021-01-15 ENCOUNTER — HEALTH MAINTENANCE LETTER (OUTPATIENT)
Age: 61
End: 2021-01-15

## 2021-02-24 DIAGNOSIS — E78.5 HYPERLIPIDEMIA WITH TARGET LDL LESS THAN 160: ICD-10-CM

## 2021-02-24 NOTE — TELEPHONE ENCOUNTER
Routing refill request to provider for review/approval because:  Labs not current:    Lab Results   Component Value Date    CHOL 215 02/07/2020     Lab Results   Component Value Date    HDL 64 02/07/2020     Lab Results   Component Value Date     02/07/2020     Lab Results   Component Value Date    TRIG 166 02/07/2020     Lab Results   Component Value Date    CHOLHDLRATIO 5.3 10/23/2015       Patient needs to be seen because it has been more than 1 year since last office visit.    Lakeisha Larkin RN

## 2021-02-25 RX ORDER — ATORVASTATIN CALCIUM 10 MG/1
10 TABLET, FILM COATED ORAL DAILY
Qty: 90 TABLET | Refills: 0 | Status: SHIPPED | OUTPATIENT
Start: 2021-02-25

## 2021-03-14 ENCOUNTER — HEALTH MAINTENANCE LETTER (OUTPATIENT)
Age: 61
End: 2021-03-14

## 2021-10-24 ENCOUNTER — HEALTH MAINTENANCE LETTER (OUTPATIENT)
Age: 61
End: 2021-10-24

## 2022-04-10 ENCOUNTER — HEALTH MAINTENANCE LETTER (OUTPATIENT)
Age: 62
End: 2022-04-10

## 2022-10-15 ENCOUNTER — HEALTH MAINTENANCE LETTER (OUTPATIENT)
Age: 62
End: 2022-10-15

## 2023-03-26 ENCOUNTER — HEALTH MAINTENANCE LETTER (OUTPATIENT)
Age: 63
End: 2023-03-26

## 2023-06-01 ENCOUNTER — HEALTH MAINTENANCE LETTER (OUTPATIENT)
Age: 63
End: 2023-06-01